# Patient Record
Sex: MALE | Race: WHITE | NOT HISPANIC OR LATINO | Employment: FULL TIME | URBAN - METROPOLITAN AREA
[De-identification: names, ages, dates, MRNs, and addresses within clinical notes are randomized per-mention and may not be internally consistent; named-entity substitution may affect disease eponyms.]

---

## 2017-07-15 ENCOUNTER — OFFICE VISIT (OUTPATIENT)
Dept: URGENT CARE | Facility: URGENT CARE | Age: 32
End: 2017-07-15
Payer: COMMERCIAL

## 2017-07-15 VITALS
SYSTOLIC BLOOD PRESSURE: 147 MMHG | HEIGHT: 74 IN | DIASTOLIC BLOOD PRESSURE: 73 MMHG | WEIGHT: 315 LBS | HEART RATE: 80 BPM | OXYGEN SATURATION: 97 % | BODY MASS INDEX: 40.43 KG/M2 | TEMPERATURE: 97.9 F

## 2017-07-15 DIAGNOSIS — J02.9 ACUTE PHARYNGITIS, UNSPECIFIED ETIOLOGY: Primary | ICD-10-CM

## 2017-07-15 DIAGNOSIS — R07.0 THROAT PAIN: ICD-10-CM

## 2017-07-15 LAB
DEPRECATED S PYO AG THROAT QL EIA: NORMAL
MICRO REPORT STATUS: NORMAL
SPECIMEN SOURCE: NORMAL

## 2017-07-15 PROCEDURE — 87880 STREP A ASSAY W/OPTIC: CPT | Performed by: INTERNAL MEDICINE

## 2017-07-15 PROCEDURE — 99202 OFFICE O/P NEW SF 15 MIN: CPT | Performed by: PHYSICIAN ASSISTANT

## 2017-07-15 PROCEDURE — 87081 CULTURE SCREEN ONLY: CPT | Performed by: INTERNAL MEDICINE

## 2017-07-15 NOTE — NURSING NOTE
"Chief Complaint   Patient presents with     Urgent Care     Pt in clinic to have eval for sore throat.     Pharyngitis       Initial /73  Pulse 80  Temp 97.9  F (36.6  C) (Tympanic)  Ht 6' 2\" (1.88 m)  Wt (!) 344 lb (156 kg)  SpO2 97%  BMI 44.17 kg/m2 Estimated body mass index is 44.17 kg/(m^2) as calculated from the following:    Height as of this encounter: 6' 2\" (1.88 m).    Weight as of this encounter: 344 lb (156 kg).  Medication Reconciliation: complete   Nicole Limon/ MA    "

## 2017-07-15 NOTE — PROGRESS NOTES
"SUBJECTIVE:  Graham Quevedo is a 32 year old male with a chief complaint of sore throat.  Onset of symptoms was 2 hours(s) ago.    Course of illness: sudden onset.  Severity mild  Current and Associated symptoms: cough  and hoarse voice  Treatment measures tried include None tried.  Predisposing factors include HX of Strep.    No past medical history on file.  Current Outpatient Prescriptions   Medication Sig Dispense Refill     order for DME Equipment being ordered: CAM walker size 14 foot (Patient not taking: Reported on 7/15/2017) 1 Device 0     order for DME Equipment being ordered: triloc ankle brace right lower extremity size 14 shoe (Patient not taking: Reported on 7/15/2017) 1 Device 0     Social History   Substance Use Topics     Smoking status: Former Smoker     Smokeless tobacco: Not on file      Comment: Only for 1 year in college     Alcohol use Not on file       ROS:  CONSTITUTIONAL:NEGATIVE for fever, chills, change in weight  EYES: NEGATIVE for vision changes or irritation  ENT/MOUTH: sore throat  RESP:POSITIVE for cough-productive    OBJECTIVE:   /73  Pulse 80  Temp 97.9  F (36.6  C) (Tympanic)  Ht 6' 2\" (1.88 m)  Wt (!) 344 lb (156 kg)  SpO2 97%  BMI 44.17 kg/m2  GENERAL APPEARANCE: healthy, alert and no distress  EYES: EOMI,  PERRL, conjunctiva clear  HENT: TM's normal bilaterally and tonsillar erythema is mild  NECK: supple, non-tender to palpation, no adenopathy noted  RESP: lungs clear to auscultation - no rales, rhonchi or wheezes  CV: regular rates and rhythm, normal S1 S2, no murmur noted  ABDOMEN:  soft, nontender, no HSM or masses and bowel sounds normal  SKIN: no suspicious lesions or rashes    Rapid Strep test is negative; await throat culture results.    ASSESSMENT:    1. Acute pharyngitis, unspecified etiology      2. Throat pain    - Strep, Rapid Screen  - Beta strep group A culture    PLAN:   See orders in epic.   Symptomatic treat with gargles, lozenges, and OTC " analgesic as needed. Follow-up with primary clinic if not improving over the next 3-5 days.

## 2017-07-15 NOTE — MR AVS SNAPSHOT
"              After Visit Summary   7/15/2017    Graham Quevedo    MRN: [id number]           Patient Information     Date Of Birth          1985        Visit Information        Provider Department      7/15/2017 8:35 AM Nash Limon PA-C [facility]        Today's Diagnoses     Acute pharyngitis, unspecified etiology    -  1    Throat pain           Follow-ups after your visit        Who to contact     If you have questions or need follow up information about today's clinic visit or your schedule please contact [facility] directly at [phone].  Normal or non-critical lab and imaging results will be communicated to you by Collective Intellecthart, letter or phone within 4 business days after the clinic has received the results. If you do not hear from us within 7 days, please contact the clinic through Collective Intellecthart or phone. If you have a critical or abnormal lab result, we will notify you by phone as soon as possible.  Submit refill requests through Hotelicopter or call your pharmacy and they will forward the refill request to us. Please allow 3 business days for your refill to be completed.          Additional Information About Your Visit        MyChart Information     Hotelicopter lets you send messages to your doctor, view your test results, renew your prescriptions, schedule appointments and more. To sign up, go to [email or web address] . Click on \"Log in\" on the left side of the screen, which will take you to the Welcome page. Then click on \"Sign up Now\" on the right side of the page.     You will be asked to enter the access code listed below, as well as some personal information. Please follow the directions to create your username and password.     Your access code is: [email or web address]  Expires: 10/13/2017  9:26 AM     Your access code will  in 90 days. If you need help or a new code, please call your [place] clinic or [phone].        Care EveryWhere ID     This is your " "Care EveryWhere ID. This could be used by other organizations to access your [place] medical records  [id number]        Your Vitals Were     Pulse Temperature Height Pulse Oximetry BMI (Body Mass Index)       80 97.9  F (36.6  C) (Tympanic) 6' 2\" (1.88 m) 97% 44.17 kg/m2        Blood Pressure from Last 3 Encounters:   07/15/17 147/73    Weight from Last 3 Encounters:   07/15/17 (!) 344 lb (156 kg)   09/23/16 (!) 350 lb (158.8 kg)              We Performed the Following     Beta strep group A culture     Strep, Rapid Screen        Primary Care Provider    None Specified       No primary provider on file.        Equal Access to Services     [facility] : Jaky Dumont, shahida angeles, aiden crespo, aman mendoza . So [facility] [phone].    ATENCIÓN: Si habla español, tiene a ward disposición servicios gratuitos de asistencia lingüística. Llame al [phone].    We comply with applicable federal civil rights laws and [place] laws. We do not discriminate on the basis of race, color, national origin, age, disability sex, sexual orientation or gender identity.            Thank you!     Thank you for choosing [facility]  for your care. Our goal is always to provide you with excellent care. Hearing back from our patients is one way we can continue to improve our services. Please take a few minutes to complete the written survey that you may receive in the mail after your visit with us. Thank you!             Your Updated Medication List - Protect others around you: Learn how to safely use, store and throw away your medicines at www.disposemymeds.org.          This list is accurate as of: 7/15/17  9:26 AM.  Always use your most recent med list.                   Brand Name Dispense Instructions for use Diagnosis    * order for DME     1 Device    Equipment being ordered: CAM walker size 14 foot    Achilles tendonitis, bilateral       * " order for DME     1 Device    Equipment being ordered: triloc ankle brace right lower extremity size 14 shoe    Achilles tendonitis, bilateral       * Notice:  This list has 2 medication(s) that are the same as other medications prescribed for you. Read the directions carefully, and ask your doctor or other care provider to review them with you.

## 2017-07-16 LAB
BACTERIA SPEC CULT: NORMAL
MICRO REPORT STATUS: NORMAL
SPECIMEN SOURCE: NORMAL

## 2017-12-21 ENCOUNTER — OFFICE VISIT (OUTPATIENT)
Dept: FAMILY MEDICINE | Facility: CLINIC | Age: 32
End: 2017-12-21
Payer: COMMERCIAL

## 2017-12-21 VITALS
WEIGHT: 315 LBS | TEMPERATURE: 97.8 F | RESPIRATION RATE: 16 BRPM | DIASTOLIC BLOOD PRESSURE: 72 MMHG | OXYGEN SATURATION: 97 % | HEART RATE: 80 BPM | SYSTOLIC BLOOD PRESSURE: 124 MMHG | BODY MASS INDEX: 47.25 KG/M2

## 2017-12-21 DIAGNOSIS — Z13.6 CARDIOVASCULAR SCREENING; LDL GOAL LESS THAN 130: ICD-10-CM

## 2017-12-21 DIAGNOSIS — B00.9 HSV (HERPES SIMPLEX VIRUS) INFECTION: Primary | ICD-10-CM

## 2017-12-21 PROCEDURE — 99213 OFFICE O/P EST LOW 20 MIN: CPT | Performed by: NURSE PRACTITIONER

## 2017-12-21 PROCEDURE — 36415 COLL VENOUS BLD VENIPUNCTURE: CPT | Performed by: NURSE PRACTITIONER

## 2017-12-21 PROCEDURE — 80061 LIPID PANEL: CPT | Performed by: NURSE PRACTITIONER

## 2017-12-21 PROCEDURE — 82565 ASSAY OF CREATININE: CPT | Performed by: NURSE PRACTITIONER

## 2017-12-21 RX ORDER — ACYCLOVIR 400 MG/1
400 TABLET ORAL 3 TIMES DAILY
Qty: 15 TABLET | Refills: 5 | Status: SHIPPED | OUTPATIENT
Start: 2017-12-21 | End: 2022-07-21

## 2017-12-21 NOTE — PROGRESS NOTES
SUBJECTIVE:   Graham Quevedo is a 32 year old male who presents to clinic today for the following health issues:      Medication Followup of Acyclovir tablets    Taking Medication as prescribed: has not taken in a few years    Side Effects:  None    Medication Helping Symptoms:  not applicable    Pt has been using OTC Abreva lately with little help        Oral HSV since elementary school.  Has been on acyclovir as needed for outbreaks in the past.  URI and stress triggers outbreak.  Last outbreak 5-6 months ago, occurring a few times a year, increased frequency last winter.      2 kids, works from home.      There is no problem list on file for this patient.    History reviewed. No pertinent surgical history.    Social History   Substance Use Topics     Smoking status: Former Smoker     Smokeless tobacco: Never Used      Comment: Only for 1 year in college     Alcohol use Not on file     History reviewed. No pertinent family history.      Current Outpatient Prescriptions   Medication Sig Dispense Refill     acyclovir (ZOVIRAX) 400 MG tablet Take 1 tablet (400 mg) by mouth 3 times daily 15 tablet 5     order for DME Equipment being ordered: CAM walker size 14 foot (Patient not taking: Reported on 7/15/2017) 1 Device 0     order for DME Equipment being ordered: triloc ankle brace right lower extremity size 14 shoe (Patient not taking: Reported on 7/15/2017) 1 Device 0       ROS:  Integ as above, otherwise negative       OBJECTIVE:                                                    /72 (BP Location: Right arm, Patient Position: Chair, Cuff Size: Adult Large)  Pulse 80  Temp 97.8  F (36.6  C) (Oral)  Resp 16  Wt (!) 368 lb (166.9 kg)  SpO2 97%  BMI 47.25 kg/m2   GENERAL APPEARANCE: healthy, alert and no distress  EYES: Eyes grossly normal to inspection and conjunctivae and sclerae normal  RESP: respirations nonlabored  PSYCH: mentation appears normal and affect normal/bright        ASSESSMENT/PLAN:                                                     (B00.9) HSV (herpes simplex virus) infection  (primary encounter diagnosis)  Plan: acyclovir (ZOVIRAX) 400 MG tablet, Creatinine        Acyclovir three times a day as needed for outbreaks, the sooner you start it the more effective it is.  Labs today.       (Z13.6) CARDIOVASCULAR SCREENING; LDL GOAL LESS THAN 130  Plan: Lipid panel reflex to direct LDL Fasting                See Patient Instructions    Hafsa Aguilera, [facility]    Patient Instructions   1.  Acyclovir three times a day as needed for outbreaks, the sooner you start it the more effective it is.  Labs today.

## 2017-12-21 NOTE — PATIENT INSTRUCTIONS
1.  Acyclovir three times a day as needed for outbreaks, the sooner you start it the more effective it is.  Labs today.

## 2017-12-21 NOTE — MR AVS SNAPSHOT
"              After Visit Summary   12/21/2017    Graham Quevedo    MRN: [id number]           Patient Information     Date Of Birth          1985        Visit Information        Provider Department      12/21/2017 12:20 PM Hafsa Aguilera APRN CNP [facility]        Today's Diagnoses     HSV (herpes simplex virus) infection    -  1    CARDIOVASCULAR SCREENING; LDL GOAL LESS THAN 130          Care Instructions    1.  Acyclovir three times a day as needed for outbreaks, the sooner you start it the more effective it is.  Labs today.             Follow-ups after your visit        Who to contact     If you have questions or need follow up information about today's clinic visit or your schedule please contact [facility] directly at [phone].  Normal or non-critical lab and imaging results will be communicated to you by MyChart, letter or phone within 4 business days after the clinic has received the results. If you do not hear from us within 7 days, please contact the clinic through Tytohart or phone. If you have a critical or abnormal lab result, we will notify you by phone as soon as possible.  Submit refill requests through Cladwell or call your pharmacy and they will forward the refill request to us. Please allow 3 business days for your refill to be completed.          Additional Information About Your Visit        MyChart Information     Cladwell lets you send messages to your doctor, view your test results, renew your prescriptions, schedule appointments and more. To sign up, go to [email or web address] . Click on \"Log in\" on the left side of the screen, which will take you to the Welcome page. Then click on \"Sign up Now\" on the right side of the page.     You will be asked to enter the access code listed below, as well as some personal information. Please follow the directions to create your username and password.     Your access code is: [email or web address]  Expires: " 3/21/2018 12:42 PM     Your access code will  in 90 days. If you need help or a new code, please call your [place] clinic or [phone].        Care EveryWhere ID     This is your Care EveryWhere ID. This could be used by other organizations to access your [place] medical records  [id number]        Your Vitals Were     Pulse Temperature Respirations Pulse Oximetry BMI (Body Mass Index)       80 97.8  F (36.6  C) (Oral) 16 97% 47.25 kg/m2        Blood Pressure from Last 3 Encounters:   17 124/72   07/15/17 147/73    Weight from Last 3 Encounters:   17 (!) 368 lb (166.9 kg)   07/15/17 (!) 344 lb (156 kg)   16 (!) 350 lb (158.8 kg)              We Performed the Following     Creatinine     Lipid panel reflex to direct LDL Fasting          Today's Medication Changes          These changes are accurate as of: 17 12:42 PM.  If you have any questions, ask your nurse or doctor.               Start taking these medicines.        Dose/Directions    acyclovir 400 MG tablet   Commonly known as:  ZOVIRAX   Used for:  HSV (herpes simplex virus) infection   Started by:  Hafsa Aguilera APRN CNP        Dose:  400 mg   Take 1 tablet (400 mg) by mouth 3 times daily   Quantity:  15 tablet   Refills:  5            Where to get your medicines      These medications were sent to [facility] [id number] - [place], [place] -  [address], [place] [address]     Phone:  [phone]     acyclovir 400 MG tablet                Primary Care Provider Office Phone # Fax #    SAMUEL Price [phone] [address]  [facility] [address]        Equal Access to Services     [facility] BLADIMIR : Jaky Dumont, wastephanie angeles, qaybta kaaljuan carlos crespo, aman [place]. So [facility] [phone].    ATENCIÓN: Si habla español, tiene a ward disposición servicios gratuitos de asistencia  lingüísticsilvinaBlanquita Bynum al [phone].    We comply with applicable federal civil rights laws and [place] laws. We do not discriminate on the basis of race, color, national origin, age, disability, sex, sexual orientation, or gender identity.            Thank you!     Thank you for choosing [facility]  for your care. Our goal is always to provide you with excellent care. Hearing back from our patients is one way we can continue to improve our services. Please take a few minutes to complete the written survey that you may receive in the mail after your visit with us. Thank you!             Your Updated Medication List - Protect others around you: Learn how to safely use, store and throw away your medicines at www.disposemymeds.org.          This list is accurate as of: 12/21/17 12:42 PM.  Always use your most recent med list.                   Brand Name Dispense Instructions for use Diagnosis    acyclovir 400 MG tablet    ZOVIRAX    15 tablet    Take 1 tablet (400 mg) by mouth 3 times daily    HSV (herpes simplex virus) infection       * order for DME     1 Device    Equipment being ordered: CAM walker size 14 foot    Achilles tendonitis, bilateral       * order for DME     1 Device    Equipment being ordered: triloc ankle brace right lower extremity size 14 shoe    Achilles tendonitis, bilateral       * Notice:  This list has 2 medication(s) that are the same as other medications prescribed for you. Read the directions carefully, and ask your doctor or other care provider to review them with you.

## 2017-12-21 NOTE — LETTER
December 27, 2017      Graham Quevedo  [address] [facility] [address]        Dear ,    We are writing to inform you of your test results.    Cholesterol looks good, normal kidney function.    Resulted Orders   Creatinine   Result Value Ref Range    Creatinine 0.73 0.66 - 1.25 mg/dL    GFR Estimate >90 >60 mL/min/1.7m2      Comment:      Non  GFR Calc    GFR Estimate If Black >90 >60 mL/min/1.7m2      Comment:      African American GFR Calc   Lipid panel reflex to direct LDL Fasting   Result Value Ref Range    Cholesterol 187 <200 mg/dL    Triglycerides 151 (H) <150 mg/dL      Comment:      Borderline high:  150-199 mg/dl  High:             200-499 mg/dl  Very high:       >499 mg/dl      HDL Cholesterol 42 >39 mg/dL    LDL Cholesterol Calculated 115 (H) <100 mg/dL      Comment:      Above desirable:  100-129 mg/dl  Borderline High:  130-159 mg/dL  High:             160-189 mg/dL  Very high:       >189 mg/dl      Non HDL Cholesterol 145 (H) <130 mg/dL      Comment:      Above Desirable:  130-159 mg/dl  Borderline high:  160-189 mg/dl  High:             190-219 mg/dl  Very high:       >219 mg/dl         If you have any questions or concerns, please call the clinic at the number listed above.       Sincerely,        Hafsa Aguilera, APRN CNP/nr

## 2017-12-22 LAB
CHOLEST SERPL-MCNC: 187 MG/DL
CREAT SERPL-MCNC: 0.73 MG/DL (ref 0.66–1.25)
GFR SERPL CREATININE-BSD FRML MDRD: >90 ML/MIN/1.7M2
HDLC SERPL-MCNC: 42 MG/DL
LDLC SERPL CALC-MCNC: 115 MG/DL
NONHDLC SERPL-MCNC: 145 MG/DL
TRIGL SERPL-MCNC: 151 MG/DL

## 2017-12-22 NOTE — PROGRESS NOTES
Please send out result letter with the following note, thanks.    Cholesterol looks good, normal kidney function.    -Hafsa Aguilera, CNP

## 2018-05-08 ENCOUNTER — OFFICE VISIT (OUTPATIENT)
Dept: FAMILY MEDICINE | Facility: CLINIC | Age: 33
End: 2018-05-08
Payer: COMMERCIAL

## 2018-05-08 VITALS
HEIGHT: 74 IN | BODY MASS INDEX: 40.43 KG/M2 | WEIGHT: 315 LBS | SYSTOLIC BLOOD PRESSURE: 147 MMHG | HEART RATE: 56 BPM | DIASTOLIC BLOOD PRESSURE: 85 MMHG | TEMPERATURE: 98 F | RESPIRATION RATE: 10 BRPM | OXYGEN SATURATION: 100 %

## 2018-05-08 DIAGNOSIS — R03.0 ELEVATED BLOOD PRESSURE READING WITHOUT DIAGNOSIS OF HYPERTENSION: ICD-10-CM

## 2018-05-08 DIAGNOSIS — N50.89 SWELLING OF RIGHT TESTICLE: Primary | ICD-10-CM

## 2018-05-08 DIAGNOSIS — B37.9 CANDIDA INFECTION: ICD-10-CM

## 2018-05-08 PROCEDURE — 99214 OFFICE O/P EST MOD 30 MIN: CPT | Performed by: FAMILY MEDICINE

## 2018-05-08 RX ORDER — FLUCONAZOLE 150 MG/1
150 TABLET ORAL ONCE
Qty: 1 TABLET | Refills: 0 | Status: SHIPPED | OUTPATIENT
Start: 2018-05-08 | End: 2018-05-08

## 2018-05-08 NOTE — MR AVS SNAPSHOT
"              After Visit Summary   2018    Graham Quevedo    MRN: [id number]           Patient Information     Date Of Birth          1985        Visit Information        Provider Department      2018 1:40 PM Bon Bhatia MD [facility]        Care Instructions    Please call [phone] to schedule scrotal ultrasound.           Follow-ups after your visit        Who to contact     If you have questions or need follow up information about today's clinic visit or your schedule please contact [facility] directly at [phone].  Normal or non-critical lab and imaging results will be communicated to you by Affaredelgiornohart, letter or phone within 4 business days after the clinic has received the results. If you do not hear from us within 7 days, please contact the clinic through Affaredelgiornohart or phone. If you have a critical or abnormal lab result, we will notify you by phone as soon as possible.  Submit refill requests through Publisha or call your pharmacy and they will forward the refill request to us. Please allow 3 business days for your refill to be completed.          Additional Information About Your Visit        MyChart Information     Publisha lets you send messages to your doctor, view your test results, renew your prescriptions, schedule appointments and more. To sign up, go to [email or web address] . Click on \"Log in\" on the left side of the screen, which will take you to the Welcome page. Then click on \"Sign up Now\" on the right side of the page.     You will be asked to enter the access code listed below, as well as some personal information. Please follow the directions to create your username and password.     Your access code is: [email or web address]  Expires: 2018  2:27 PM     Your access code will  in 90 days. If you need help or a new code, please call your [facility] or [phone].        Care EveryWhere ID     This is your Care EveryWhere ID. This " "could be used by other organizations to access your [place] medical records  [id number]        Your Vitals Were     Pulse Temperature Respirations Height Pulse Oximetry BMI (Body Mass Index)    56 98  F (36.7  C) (Oral) 10 6' 2\" (1.88 m) 100% 47.25 kg/m2       Blood Pressure from Last 3 Encounters:   05/08/18 147/85   12/21/17 124/72   07/15/17 147/73    Weight from Last 3 Encounters:   05/08/18 (!) 368 lb (166.9 kg)   12/21/17 (!) 368 lb (166.9 kg)   07/15/17 (!) 344 lb (156 kg)              Today, you had the following     No orders found for display       Primary Care Provider Office Phone # Fax #    Hafsa SAMUEL Reis [facility] [phone]       [address]  [facility] [address]        Equal Access to Services     TYREE GARRISON : Hadii grisel ku hadasho Sostevenali, waaxda luqadaha, qaybta kaalmada adeegyada, waxkaci hayesin germania mendoza . So [facility] [phone].    ATENCIÓN: Si habla español, tiene a ward disposición servicios gratuitos de asistencia lingüística. Llame al [phone].    We comply with applicable federal civil rights laws and [place] laws. We do not discriminate on the basis of race, color, national origin, age, disability, sex, sexual orientation, or gender identity.            Thank you!     Thank you for choosing [facility]  for your care. Our goal is always to provide you with excellent care. Hearing back from our patients is one way we can continue to improve our services. Please take a few minutes to complete the written survey that you may receive in the mail after your visit with us. Thank you!             Your Updated Medication List - Protect others around you: Learn how to safely use, store and throw away your medicines at www.disposemymeds.org.          This list is accurate as of 5/8/18  2:27 PM.  Always use your most recent med list.                   Brand Name Dispense Instructions for use Diagnosis    acyclovir 400 MG tablet    ZOVIRAX "    15 tablet    Take 1 tablet (400 mg) by mouth 3 times daily    HSV (herpes simplex virus) infection       * order for DME     1 Device    Equipment being ordered: CAM walker size 14 foot    Achilles tendonitis, bilateral       * order for DME     1 Device    Equipment being ordered: triloc ankle brace right lower extremity size 14 shoe    Achilles tendonitis, bilateral       * Notice:  This list has 2 medication(s) that are the same as other medications prescribed for you. Read the directions carefully, and ask your doctor or other care provider to review them with you.

## 2018-05-08 NOTE — PROGRESS NOTES
"  SUBJECTIVE:   Graham Quevedo is a 32 year old male who presents to clinic today for the following health issues:    Around one month ago pt was taking a shower and he noticed his right testicle was larger then his left. The last few times he has had intercourse with his wife, she had a yeast infection. He wanted to make sure that everything was ok. No testicular pain/discomfort, urethral discharge or rash. Pt is circumscribed.     Problem list and histories reviewed & adjusted, as indicated.  Additional history: as documented    Labs reviewed in EPIC    Reviewed and updated as needed this visit by clinical staff  Allergies       Reviewed and updated as needed this visit by Provider         ROS:  Constitutional, HEENT, cardiovascular, pulmonary, gi and gu systems are negative, except as otherwise noted.    OBJECTIVE:     /85  Pulse 56  Temp 98  F (36.7  C) (Oral)  Resp 10  Ht 6' 2\" (1.88 m)  Wt (!) 368 lb (166.9 kg)  SpO2 100%  BMI 47.25 kg/m2  Body mass index is 47.25 kg/(m^2).   GENERAL: healthy, alert and no distress  EYES: Eyes grossly normal to inspection  HENT: nose and mouth without ulcers or lesions   (male): no urethral discharge, no hernia, right testicle larger than left with no tenderness   SKIN: no rashes     Diagnostic Test Results:  none     ASSESSMENT/PLAN:       1. Swelling of right testicle  - ordered an US for further evaluation   - US Testicular and Scrotum; Future    2. Candida infection  - Wife has had recurrent yeast infections and pt is concerned that he might have the infection. We discussed that exam has been normal however if his wife has a recurrent infection over the next month then pt can take Fluconazole X 1.   - fluconazole (DIFLUCAN) 150 MG tablet; Take 1 tablet (150 mg) by mouth once for 1 dose  Dispense: 1 tablet; Refill: 0    3. Elevated blood pressure reading without diagnosis of hypertension  - MA blood pressure check in one week         Bon Bhatia, " MD  [facility]

## 2018-05-09 ENCOUNTER — HOSPITAL ENCOUNTER (OUTPATIENT)
Dept: ULTRASOUND IMAGING | Facility: CLINIC | Age: 33
Discharge: HOME | End: 2018-05-09
Attending: FAMILY MEDICINE | Admitting: FAMILY MEDICINE
Payer: COMMERCIAL

## 2018-05-09 DIAGNOSIS — N50.89 SWELLING OF RIGHT TESTICLE: ICD-10-CM

## 2018-05-09 PROCEDURE — 76870 US EXAM SCROTUM: CPT

## 2018-05-15 ENCOUNTER — TELEPHONE (OUTPATIENT)
Dept: FAMILY MEDICINE | Facility: CLINIC | Age: 33
End: 2018-05-15

## 2018-05-15 NOTE — TELEPHONE ENCOUNTER
RN, can you please inform pt of ultrasound results which showed bilateral fluid collection within the scrotum which could represent hematoma (collection of blood) or hydrocele (collection of fluid). I have referred him to urology clinic for further evaluation.       Your provider has referred you to: [facility] [place] [facility] [place] [phone]   [email or web address]      IMPRESSION:  1. Bilateral mildly complex fluid collections within the scrotum,  moderate in size with internal septations on the right and small  volume on the left. Additionally, there are several mobile, echogenic  foci within the scrotum, the largest measuring up to 7 mm on the  right. Findings are nonspecific though possible differential includes  evolution of hematoma/hematocele versus complex hydrocele. Correlate  with past history of trauma and/or infection and consider follow-up  ultrasound to ensure stability/improvement if clinically indicated.  2. Normal sonographic appearance of the testicles without focal mass.  Normal Doppler evaluation without testicular torsion.

## 2018-05-15 NOTE — TELEPHONE ENCOUNTER
Attempted to reach, unable. Left message  to call back.    Pt to call to schedule with urology:  Your provider has referred you to: [facility]: [place] [facility] - [place] [phone]   [email or web address]        Samantha Sanders RN

## 2018-05-17 NOTE — TELEPHONE ENCOUNTER
I was able to reach patient and gave him Jannette's msg and the referral phone number.  He agrees with plan.    Eliza MARTÍNEZ

## 2018-05-18 ENCOUNTER — PRE VISIT (OUTPATIENT)
Dept: UROLOGY | Facility: CLINIC | Age: 33
End: 2018-05-18

## 2018-05-18 NOTE — TELEPHONE ENCOUNTER
MEDICAL RECORDS REQUEST   [facility] [facility]  Urology Clinic  [address] [place] [address]  PHONE: [phone]  Fax: [phone]        FUTURE VISIT INFORMATION                                                   Graham Quevedo, : 1985 scheduled for future visit at [facility]    APPOINTMENT INFORMATION:    Date: 2018    Provider:  CONTRERAS ROMERO    Reason for Visit/Diagnosis: TESTICULAR SWELLING    REFERRAL INFORMATION:    Referring provider:  DUSTIN GAFFNEY    Specialty: PCP    Referring providers clinic:  [facility] contact number:  [phone]    RECORDS REQUESTED FOR VISIT                                                     NOTES  STATUS/DETAILS   OFFICE NOTE from referring provider  yes   OFFICE NOTE from other specialist  no   DISCHARGE SUMMARY from hospital  no   DISCHARGE REPORT from the ER  no   OPERATIVE REPORT  no   MEDICATION LIST  no   IMAGING (IMAGES & REPORT)  yes   TESTICULAR US IN EPIC.. [facility]       PRE-VISIT CHECKLIST      Record collection complete Yes RECORDS IN EPIC.. [facility]   Appointment appropriately scheduled           (right time/right provider) Yes   Joint diagnostic appointment coordinated correctly          (ensure right order & amount of time) Yes   MyChart activation Yes   Questionnaire complete If no, please explain IN PROCESS     Completed by: Delfina Limon

## 2018-05-24 ENCOUNTER — TELEPHONE (OUTPATIENT)
Dept: FAMILY MEDICINE | Facility: CLINIC | Age: 33
End: 2018-05-24

## 2018-05-24 DIAGNOSIS — N50.89 SWOLLEN TESTICLE: Primary | ICD-10-CM

## 2018-05-24 NOTE — TELEPHONE ENCOUNTER
Patient was referred to urology at the [facility] for testicular swelling  Earliest they were able to give him an appointment was 7/12/18.    Wife is asking where else he can go to be seen or how he can get in sooner.  To be seen sooner at the  would need provider to provider call or I have pended referral with other urology clinic options.    Provider to provider for earlier appointment would need to contact the on-call by calling [phone]  Please call patient back with options.  Aydee Silverman, RN

## 2018-05-24 NOTE — TELEPHONE ENCOUNTER
I left detailed msg with additional urology clinic phone number.  I also told them if they need numbers repeated they could call back and ask the phone rep to read them again.      UROLOGY ADULT REFERRAL [[id number]]      Electronically signed by: Geno Soria PA-C on 05/24/18 1416 Status: Active     Ordering user: Geno Soria PA-C 05/24/18 1416       Released by: Geno Soria PA-C 05/24/18 1416         Order History  Outpatient     Date/Time Action Taken User Additional Information     05/24/18 1211 Pend Aydee Silverman RN      05/24/18 1416 Sign Geno Soria PA-C        Comments      Your provider has referred you to:   [facility]: [facility] [place] [phone]   [email or web address]  [facility]: [facility] [place] [phone]   [email or web address]  [facility]: [facility] [place] [phone]   [email or web address]  [facility]: [facility] [place] [phone]   [email or web address]  [place] [phone]   [email or web address]  [place] [phone]   [email or web address]

## 2018-07-02 ENCOUNTER — PRE VISIT (OUTPATIENT)
Dept: UROLOGY | Facility: CLINIC | Age: 33
End: 2018-07-02

## 2018-07-02 NOTE — TELEPHONE ENCOUNTER
Patient with history of testicular swelling coming in for consult with Dr. Cassidy. Patient chart reviewed, no need for call, all records available and ready for appointment.

## 2018-12-18 ENCOUNTER — APPOINTMENT (OUTPATIENT)
Dept: CT IMAGING | Facility: CLINIC | Age: 33
End: 2018-12-18
Attending: EMERGENCY MEDICINE
Payer: COMMERCIAL

## 2018-12-18 ENCOUNTER — HOSPITAL ENCOUNTER (EMERGENCY)
Facility: CLINIC | Age: 33
Discharge: HOME | End: 2018-12-18
Attending: EMERGENCY MEDICINE | Admitting: EMERGENCY MEDICINE
Payer: COMMERCIAL

## 2018-12-18 VITALS
WEIGHT: 315 LBS | BODY MASS INDEX: 40.43 KG/M2 | SYSTOLIC BLOOD PRESSURE: 132 MMHG | DIASTOLIC BLOOD PRESSURE: 90 MMHG | TEMPERATURE: 98.8 F | HEIGHT: 74 IN | RESPIRATION RATE: 20 BRPM | OXYGEN SATURATION: 96 %

## 2018-12-18 DIAGNOSIS — S09.90XA CLOSED HEAD INJURY, INITIAL ENCOUNTER: ICD-10-CM

## 2018-12-18 DIAGNOSIS — S16.1XXA CERVICAL STRAIN, INITIAL ENCOUNTER: ICD-10-CM

## 2018-12-18 PROCEDURE — 25000132 ZZH RX MED GY IP 250 OP 250 PS 637: Performed by: EMERGENCY MEDICINE

## 2018-12-18 PROCEDURE — 70450 CT HEAD/BRAIN W/O DYE: CPT

## 2018-12-18 PROCEDURE — 72125 CT NECK SPINE W/O DYE: CPT

## 2018-12-18 PROCEDURE — 99284 EMERGENCY DEPT VISIT MOD MDM: CPT | Mod: 25

## 2018-12-18 RX ORDER — ACETAMINOPHEN 325 MG/1
650 TABLET ORAL ONCE
Status: COMPLETED | OUTPATIENT
Start: 2018-12-18 | End: 2018-12-18

## 2018-12-18 RX ADMIN — ACETAMINOPHEN 650 MG: 325 TABLET, FILM COATED ORAL at 22:24

## 2018-12-18 ASSESSMENT — ENCOUNTER SYMPTOMS
HEADACHES: 1
ABDOMINAL PAIN: 0
NAUSEA: 0
ARTHRALGIAS: 1

## 2018-12-18 ASSESSMENT — MIFFLIN-ST. JEOR: SCORE: 2592.34

## 2018-12-18 NOTE — ED AVS SNAPSHOT
Emergency Department  [address] [facility] [address]  Phone:  [phone]  Fax:  [phone]                                    Graham Quevedo   MRN: [id number]    Department:   Emergency Department   Date of Visit:  12/18/2018           After Visit Summary Signature Page    I have received my discharge instructions, and my questions have been answered. I have discussed any challenges I see with this plan with the nurse or doctor.    ..........................................................................................................................................  Patient/Patient Representative Signature      ..........................................................................................................................................  Patient Representative Print Name and Relationship to Patient    ..................................................               ................................................  Date                                   Time    ..........................................................................................................................................  Reviewed by Signature/Title    ...................................................              ..............................................  Date                                               Time          22EPIC Rev 08/18

## 2018-12-19 NOTE — ED PROVIDER NOTES
"  History     Chief Complaint:  Fall    The history is provided by the patient.      Graham Quevedo is an otherwise healthy 33 year old male who presents for evaluation of a headache and pain to the back of his head and neck secondary to a fall sustained just prior to presentation.  Patient reports that at 7:30 PM today he was helping paint his friend's house when he \"missed a step\" and fell backwards down a flight of stairs, hitting the back of his head on the stairs which were carpeted.  The patient's wife states that these carpeted steps were very thin and that there was cement directly underneath.  Patient states that he \"saw stars\" when this occurred but that he did not lose consciousness.  The patient also endorses some left elbow soreness after this fall along with chronic lower back pain which he has had since he fell off a ladder last week. Patient denies nausea, chest pain, abdominal pain, or other complaints.      Allergies:  No known drug allergies     Medications:    The patient is not currently taking any prescribed medications.     Past Medical History:    The patient does not have any past pertinent medical history.     Past Surgical History:    History reviewed. No pertinent surgical history.     Family History:    History reviewed. No pertinent family history.      Social History:  Presents with spouse   Tobacco use: Former smoker (for 1 year)  Alcohol use: Not on file   PCP: Physician No Ref-Primary    Marital Status:       Review of Systems   Cardiovascular: Negative for chest pain.   Gastrointestinal: Negative for abdominal pain and nausea.   Musculoskeletal: Positive for arthralgias.   Neurological: Positive for headaches.   All other systems reviewed and are negative.    Physical Exam     Patient Vitals for the past 24 hrs:   BP Temp Temp src Heart Rate Resp SpO2 Height Weight   12/18/18 2136 132/90 98.8  F (37.1  C) Oral 82 20 96 % 1.88 m (6' 2\") (!) 158.8 kg (350 lb)        Physical " Exam  Nursing note and vitals reviewed.  Constitutional:  Oriented to person, place, and time. Cooperative.  In a c-collar.  HENT:   Head:   Atraumatic.  Nose:    Nose normal.   Mouth/Throat:   Mucous membranes are normal.   Eyes:    Conjunctivae normal and EOM are normal.      Pupils are equal, round, and reactive to light.   Neck:    Tenderness to palpation to the cervical spine region.  Cardiovascular:  Normal rate, regular rhythm, normal heart sounds and normal pulses. No murmur heard.  Pulmonary/Chest:  Effort normal and breath sounds normal.   Abdominal:   Soft. Normal appearance and bowel sounds are normal.      There is no tenderness.      There is no rebound and no CVA tenderness.   Musculoskeletal:  Extremities atraumatic x 4 including the left elbow.   Lymphadenopathy:  No cervical adenopathy.   Neurological:   Alert and oriented to person, place, and time. Normal strength.      No cranial nerve deficit or sensory deficit. GCS eye subscore is 4. GCS verbal subscore is 5. GCS motor subscore is 6.   Skin:    Skin is intact. No rash noted.   Psychiatric:   Normal mood and affect.     Emergency Department Course     Imaging:  Radiographic findings were communicated with the patient and family who voiced understanding of the findings.    CT Head without contrast:  IMPRESSION: Normal CT scan of the head    CT Cervical spine without contrast:  IMPRESSION:    1. No evidence of acute trauma.  2. C6-C7 mild degenerative disc disease.  3. Beam hardening artifact obscures the spinal canal from C6 down    Imaging independently reviewed and agree with radiologist interpretation.       Interventions:  2215: Tylenol 650 mg PO      Emergency Department Course:  Past medical records, nursing notes, and vitals reviewed.  2148: I performed an exam of the patient and obtained history, as documented above.     Above interventions provided.  The patient was sent for a head CT while in the emergency department, findings  above.    2235: I rechecked the patient. Findings and plan explained to the Patient and spouse. Patient discharged home with instructions regarding supportive care, medications, and reasons to return. The importance of close follow-up was reviewed.       Impression & Plan    Medical Decision Making:  This is a 33-year-old male who came in after he fell down some stairs and sustained a head injury as well as neck pain.  He had a c-collar placed in triage.  He was provided Tylenol here per his request, and I obtained CT scans of his head and his neck.  There are no signs of an intracranial hemorrhage or skull fracture or any cervical spine fractures.  I then removed his c-collar, and he had normal range of motion of his neck.  He understands that he could have a concussion and the ramifications of that diagnosis.  He also understands that he likely will feel worse tomorrow.  He is instructed to return with any concerns or worsening symptoms and follow-up with his physician if he is not improving in a timely fashion.    Diagnosis:    ICD-10-CM   1. Closed head injury, initial encounter S09.90XA   2. Cervical strain, initial encounter S16.1XXA       Disposition:  Discharged to home with plan as outlined.    Scribe Disclosure:  I, Virgil Marek, am serving as a scribe at 10:00 PM on 12/18/2018 to document services personally performed by Torsten Valero MD based on my observations and the provider's statements to me.  12/18/2018   EMERGENCY DEPARTMENT      Torsten Valero MD  12/19/18 0009

## 2021-07-09 ENCOUNTER — OFFICE VISIT (OUTPATIENT)
Dept: FAMILY MEDICINE | Facility: CLINIC | Age: 36
End: 2021-07-09
Payer: COMMERCIAL

## 2021-07-09 VITALS
TEMPERATURE: 97.9 F | HEART RATE: 74 BPM | OXYGEN SATURATION: 97 % | BODY MASS INDEX: 39.17 KG/M2 | WEIGHT: 315 LBS | DIASTOLIC BLOOD PRESSURE: 64 MMHG | RESPIRATION RATE: 10 BRPM | HEIGHT: 75 IN | SYSTOLIC BLOOD PRESSURE: 128 MMHG

## 2021-07-09 DIAGNOSIS — Z30.09 VASECTOMY EVALUATION: ICD-10-CM

## 2021-07-09 DIAGNOSIS — E66.01 MORBID OBESITY (H): ICD-10-CM

## 2021-07-09 DIAGNOSIS — Z01.818 PRE-OPERATIVE EXAMINATION: Primary | ICD-10-CM

## 2021-07-09 DIAGNOSIS — N43.3 HYDROCELE IN ADULT: ICD-10-CM

## 2021-07-09 LAB
ANION GAP SERPL CALCULATED.3IONS-SCNC: 5 MMOL/L (ref 3–14)
BUN SERPL-MCNC: 16 MG/DL (ref 7–30)
CALCIUM SERPL-MCNC: 9.4 MG/DL (ref 8.5–10.1)
CHLORIDE SERPL-SCNC: 110 MMOL/L (ref 94–109)
CO2 SERPL-SCNC: 25 MMOL/L (ref 20–32)
CREAT SERPL-MCNC: 0.89 MG/DL (ref 0.66–1.25)
GFR SERPL CREATININE-BSD FRML MDRD: >90 ML/MIN/{1.73_M2}
GLUCOSE SERPL-MCNC: 150 MG/DL (ref 70–99)
HGB BLD-MCNC: 16.6 G/DL (ref 13.3–17.7)
POTASSIUM SERPL-SCNC: 4.5 MMOL/L (ref 3.4–5.3)
SODIUM SERPL-SCNC: 140 MMOL/L (ref 133–144)

## 2021-07-09 PROCEDURE — 80048 BASIC METABOLIC PNL TOTAL CA: CPT | Performed by: FAMILY MEDICINE

## 2021-07-09 PROCEDURE — 36415 COLL VENOUS BLD VENIPUNCTURE: CPT | Performed by: FAMILY MEDICINE

## 2021-07-09 PROCEDURE — 85018 HEMOGLOBIN: CPT | Performed by: FAMILY MEDICINE

## 2021-07-09 PROCEDURE — 99204 OFFICE O/P NEW MOD 45 MIN: CPT | Performed by: FAMILY MEDICINE

## 2021-07-09 ASSESSMENT — MIFFLIN-ST. JEOR: SCORE: 2725.67

## 2021-07-09 ASSESSMENT — PAIN SCALES - GENERAL: PAINLEVEL: SEVERE PAIN (7)

## 2021-07-09 NOTE — PROGRESS NOTES
[address] [address]  Phone: [phone]  Primary Provider: Irvin Veewn  Pre-op Performing Provider: VELIA ALVAREZ     :[id number]}  PREOPERATIVE EVALUATION:  Today's date: 7/9/2021    Graham Quevedo is a 36 year old male who presents for a preoperative evaluation.    Surgical Information:  Surgery/Procedure: Right Hydrocelectomy and vesectomy  Surgery Location: [facility] OR  Surgeon: Michoacano  Surgery Date: 07/13/21  Time of Surgery: TBD  Where patient plans to recover: At home with family  Fax number for surgical facility: Note does not need to be faxed, will be available electronically in Epic.    Type of Anesthesia Anticipated: to be determined        Subjective     HPI related to upcoming procedure:     Preop Questions 7/9/2021   1. Have you ever had a heart attack or stroke? No   2. Have you ever had surgery on your heart or blood vessels, such as a stent placement, a coronary artery bypass, or surgery on an artery in your head, neck, heart, or legs? No   3. Do you have chest pain with activity? No   4. Do you have a history of  heart failure? No   5. Do you currently have a cold, bronchitis or symptoms of other infection? No   6. Do you have a cough, shortness of breath, or wheezing? No   7. Do you or anyone in your family have previous history of blood clots? No   8. Do you or does anyone in your family have a serious bleeding problem such as prolonged bleeding following surgeries or cuts? No   9. Have you ever had problems with anemia or been told to take iron pills? No   10. Have you had any abnormal blood loss such as black, tarry or bloody stools? No   11. Have you ever had a blood transfusion? No   12. Are you willing to have a blood transfusion if it is medically needed before, during, or after your surgery? Yes   13. Have you or any of your relatives ever had problems with anesthesia? No   14. Do you have sleep apnea, excessive  "snoring or daytime drowsiness? No   15. Do you have any artifical heart valves or other implanted medical devices like a pacemaker, defibrillator, or continuous glucose monitor? No   16. Do you have artificial joints? No   17. Are you allergic to latex? No       Health Care Directive:  Patient does not have a Health Care Directive or Living Will:    Preoperative Review of :   reviewed - no record of controlled substances prescribed.              Review of Systems  CONSTITUTIONAL: NEGATIVE for fever, chills, change in weight  ENT/MOUTH: NEGATIVE for ear, mouth and throat problems  RESP: NEGATIVE for significant cough or SOB  CV: NEGATIVE for chest pain, palpitations or peripheral edema  Rest of the ROS is negative.    Patient Active Problem List    Diagnosis Date Noted     Morbid obesity (H) 07/09/2021     Priority: Medium     Lipoma Of The Adipose Tissue      Priority: Medium     Created by Conversion  Replacement Utility updated for latest IMO load          No past medical history on file.  No past surgical history on file.  Current Outpatient Medications   Medication Sig Dispense Refill     acyclovir (ZOVIRAX) 400 MG tablet Take 1 tablet (400 mg) by mouth 3 times daily (Patient not taking: Reported on 5/8/2018) 15 tablet 5     order for DME Equipment being ordered: CAM walker size 14 foot (Patient not taking: Reported on 5/8/2018) 1 Device 0     order for DME Equipment being ordered: triloc ankle brace right lower extremity size 14 shoe (Patient not taking: Reported on 5/8/2018) 1 Device 0       No Known Allergies     Social History     Tobacco Use     Smoking status: Former Smoker     Smokeless tobacco: Never Used     Tobacco comment: Only for 1 year in college   Substance Use Topics     Alcohol use: Not on file     No family history on file.  History   Drug Use Not on file         Objective     /64   Pulse 74   Temp 97.9  F (36.6  C) (Tympanic)   Resp 10   Ht 1.905 m (6' 3\")   Wt (!) 172 kg (379 " lb 3.2 oz)   SpO2 97%   BMI 47.40 kg/m      Physical Exam  GENERAL APPEARANCE: healthy, alert and no distress  HENT: ear canals and TM's normal and nose and mouth without ulcers or lesions  RESP: lungs clear to auscultation - no rales, rhonchi or wheezes  CV: regular rate and rhythm, normal S1 S2, no S3 or S4 and no murmur, click or rub   ABDOMEN: soft, nontender, no HSM or masses and bowel sounds normal  NEURO: Normal strength and tone, sensory exam grossly normal, mentation intact and speech normal      Diagnostics:  Labs pending at this time.  Results will be reviewed when available.   No EKG required, no history of coronary heart disease, significant arrhythmia, peripheral arterial disease or other structural heart disease.    Revised Cardiac Risk Index (RCRI):  The patient has the following serious cardiovascular risks for perioperative complications:   - No serious cardiac risks = 0 points     RCRI Interpretation: 0 points: Class I (very low risk - 0.4% complication rate)      ICD-10-CM    1. Pre-operative examination  Z01.818 REVIEW OF HEALTH MAINTENANCE PROTOCOL ORDERS     Hemoglobin     Basic metabolic panel   2. Morbid obesity (H)  E66.01    3. Hydrocele in adult  N43.3 REVIEW OF HEALTH MAINTENANCE PROTOCOL ORDERS     Hemoglobin     Basic metabolic panel   4. Vasectomy evaluation  Z30.09      Patient does not have any risk factor he is advised to proceed with the surgery as planned. No contraindication.       Signed Electronically by: Cole Lund MD  Copy of this evaluation report is provided to requesting physician.

## 2021-08-14 ENCOUNTER — HEALTH MAINTENANCE LETTER (OUTPATIENT)
Age: 36
End: 2021-08-14

## 2021-10-09 ENCOUNTER — HEALTH MAINTENANCE LETTER (OUTPATIENT)
Age: 36
End: 2021-10-09

## 2022-07-21 ENCOUNTER — OFFICE VISIT (OUTPATIENT)
Dept: FAMILY MEDICINE | Facility: CLINIC | Age: 37
End: 2022-07-21
Payer: COMMERCIAL

## 2022-07-21 VITALS
HEIGHT: 75 IN | DIASTOLIC BLOOD PRESSURE: 92 MMHG | RESPIRATION RATE: 16 BRPM | BODY MASS INDEX: 39.17 KG/M2 | SYSTOLIC BLOOD PRESSURE: 134 MMHG | OXYGEN SATURATION: 97 % | HEART RATE: 100 BPM | TEMPERATURE: 97.3 F | WEIGHT: 315 LBS

## 2022-07-21 DIAGNOSIS — Z01.818 PREOP GENERAL PHYSICAL EXAM: Primary | ICD-10-CM

## 2022-07-21 DIAGNOSIS — E66.01 MORBID OBESITY (H): ICD-10-CM

## 2022-07-21 DIAGNOSIS — Z23 NEED FOR DIPHTHERIA-TETANUS-PERTUSSIS (TDAP) VACCINE: ICD-10-CM

## 2022-07-21 DIAGNOSIS — N43.3 HYDROCELE IN ADULT: ICD-10-CM

## 2022-07-21 LAB — HGB BLD-MCNC: 15.9 G/DL (ref 13.3–17.7)

## 2022-07-21 PROCEDURE — 90715 TDAP VACCINE 7 YRS/> IM: CPT | Performed by: PHYSICIAN ASSISTANT

## 2022-07-21 PROCEDURE — 99214 OFFICE O/P EST MOD 30 MIN: CPT | Mod: 25 | Performed by: PHYSICIAN ASSISTANT

## 2022-07-21 PROCEDURE — 36415 COLL VENOUS BLD VENIPUNCTURE: CPT | Performed by: PHYSICIAN ASSISTANT

## 2022-07-21 PROCEDURE — 90471 IMMUNIZATION ADMIN: CPT | Performed by: PHYSICIAN ASSISTANT

## 2022-07-21 PROCEDURE — 80053 COMPREHEN METABOLIC PANEL: CPT | Performed by: PHYSICIAN ASSISTANT

## 2022-07-21 PROCEDURE — 85018 HEMOGLOBIN: CPT | Performed by: PHYSICIAN ASSISTANT

## 2022-07-21 NOTE — PROGRESS NOTES
[facility]  [address], [address]  [facility] [address]  Phone: [phone]  Primary Provider: [facility]  Pre-op Performing Provider: VERONIQUE PACHECO      PREOPERATIVE EVALUATION:  Today's date: 7/21/2022    Graham Quevedo is a 37 year old male who presents for a preoperative evaluation.    Surgical Information:  Surgery/Procedure: BILATERAL VASECTOMY  Surgery Location: [facility]   Surgeon: Xavier Mustafa MD  Surgery Date: 07/27/2022  Time of Surgery: TBD  Where patient plans to recover: At home with family  Fax number for surgical facility: Note does not need to be faxed, will be available electronically in Epic.    Type of Anesthesia Anticipated: General    Assessment & Plan     The proposed surgical procedure is considered INTERMEDIATE risk.      ICD-10-CM    1. Preop general physical exam  Z01.818 Hemoglobin     Comprehensive metabolic panel     Hemoglobin     Comprehensive metabolic panel   2. Hydrocele in adult  N43.3    3. Morbid obesity (H)  E66.01    4. Need for diphtheria-tetanus-pertussis (Tdap) vaccine  Z23 TDAP VACCINE (Adacel, Boostrix)       Risks and Recommendations:  The patient has the following additional risks and recommendations for perioperative complications:   - No identified additional risk factors other than previously addressed    Medication Instructions:  Patient is on no chronic medications    RECOMMENDATION:  APPROVAL GIVEN to proceed with proposed procedure, without further diagnostic evaluation.    Review of prior external note(s) from - previous routine PCP and speciality notes    15 minutes spent on the date of the encounter doing chart review, history and exam, documentation and further activities per the note        Subjective     HPI related to upcoming procedure: history of hydrocele with plans for repair/vasectomy    Preop Questions 7/21/2022   1. Have you ever had a heart  attack or stroke? No   2. Have you ever had surgery on your heart or blood vessels, such as a stent placement, a coronary artery bypass, or surgery on an artery in your head, neck, heart, or legs? No   3. Do you have chest pain with activity? No   4. Do you have a history of  heart failure? No   5. Do you currently have a cold, bronchitis or symptoms of other infection? No   6. Do you have a cough, shortness of breath, or wheezing? No   7. Do you or anyone in your family have previous history of blood clots? No   8. Do you or does anyone in your family have a serious bleeding problem such as prolonged bleeding following surgeries or cuts? No   9. Have you ever had problems with anemia or been told to take iron pills? No   10. Have you had any abnormal blood loss such as black, tarry or bloody stools? No   11. Have you ever had a blood transfusion? No   12. Are you willing to have a blood transfusion if it is medically needed before, during, or after your surgery? Yes   13. Have you or any of your relatives ever had problems with anesthesia? No   14. Do you have sleep apnea, excessive snoring or daytime drowsiness? No   15. Do you have any artifical heart valves or other implanted medical devices like a pacemaker, defibrillator, or continuous glucose monitor? No   16. Do you have artificial joints? No   17. Are you allergic to latex? No       Health Care Directive:  Patient does not have a Health Care Directive or Living Will: Discussed advance care planning with patient; however, patient declined at this time.    Preoperative Review of :   reviewed - no record of controlled substances prescribed.      Status of Chronic Conditions:  See problem list for active medical problems.  Problems all longstanding and stable, except as noted/documented.  See ROS for pertinent symptoms related to these conditions.      Review of Systems  Constitutional, neuro, ENT, endocrine, pulmonary, cardiac, gastrointestinal,  "genitourinary, musculoskeletal, integument and psychiatric systems are negative, except as otherwise noted.    Patient Active Problem List    Diagnosis Date Noted     Morbid obesity (H) 07/09/2021     Priority: Medium     Lipoma Of The Adipose Tissue      Priority: Medium     Created by Conversion  Replacement Utility updated for latest IMO load          History reviewed. No pertinent past medical history.  History reviewed. No pertinent surgical history.  No current outpatient medications on file.       No Known Allergies     Social History     Tobacco Use     Smoking status: Former Smoker     Smokeless tobacco: Never Used     Tobacco comment: Only for 1 year in college   Substance Use Topics     Alcohol use: Not on file     History reviewed. No pertinent family history.  History   Drug Use Not on file         Objective     BP (!) 134/92   Pulse 100   Temp 97.3  F (36.3  C)   Resp 16   Ht 1.905 m (6' 3\")   Wt (!) 173.6 kg (382 lb 12.8 oz)   SpO2 97%   BMI 47.85 kg/m      Physical Exam    GENERAL APPEARANCE: healthy, alert and no distress     EYES: EOMI,  PERRL     HENT: ear canals and TM's normal and nose and mouth without ulcers or lesions     NECK: no adenopathy, no asymmetry, masses, or scars and thyroid normal to palpation     RESP: lungs clear to auscultation - no rales, rhonchi or wheezes     CV: regular rates and rhythm, normal S1 S2, no S3 or S4 and no murmur, click or rub     ABDOMEN:  soft, nontender, no HSM or masses and bowel sounds normal     MS: extremities normal- no gross deformities noted, no evidence of inflammation in joints, FROM in all extremities.     SKIN: no suspicious lesions or rashes     NEURO: Normal strength and tone, sensory exam grossly normal, mentation intact and speech normal     PSYCH: mentation appears normal. and affect normal/bright     LYMPHATICS: No cervical adenopathy    Recent Labs   Lab Test 07/09/21  0921   HGB 16.6      POTASSIUM 4.5   CR 0.89    "     Diagnostics:  Labs pending at this time.  Results will be reviewed when available.   No EKG required, no history of coronary heart disease, significant arrhythmia, peripheral arterial disease or other structural heart disease.    Revised Cardiac Risk Index (RCRI):  The patient has the following serious cardiovascular risks for perioperative complications:   - No serious cardiac risks = 0 points     RCRI Interpretation: 0 points: Class I (very low risk - 0.4% complication rate)           Signed Electronically by: Geno Soria PA-C  Copy of this evaluation report is provided to requesting physician.

## 2022-07-21 NOTE — PATIENT INSTRUCTIONS
Preparing for Your Surgery  Getting started  A nurse will call you to review your health history and instructions. They will give you an arrival time based on your scheduled surgery time. Please be ready to share:    Your doctor's clinic name and phone number    Your medical, surgical and anesthesia history    A list of allergies and sensitivities    A list of medicines, including herbal treatments and over-the-counter drugs    Whether the patient has a legal guardian (ask how to send us the papers in advance)  Please tell us if you're pregnant--or if there's any chance you might be pregnant. Some surgeries may injure a fetus (unborn baby), so they require a pregnancy test. Surgeries that are safe for a fetus don't always need a test, and you can choose whether to have one.   If you have a child who's having surgery, please ask for a copy of Preparing for Your Child's Surgery.    Preparing for surgery    Within 30 days of surgery: Have a pre-op exam (sometimes called an H&P, or History and Physical). This can be done at a clinic or pre-operative center.  ? If you're having a , you may not need this exam. Talk to your care team.    At your pre-op exam, talk to your care team about all medicines you take. If you need to stop any medicines before surgery, ask when to start taking them again.  ? We do this for your safety. Many medicines can make you bleed too much during surgery. Some change how well surgery (anesthesia) drugs work.    Call your insurance company to let them know you're having surgery. (If you don't have insurance, call [phone].)    Call your clinic if there's any change in your health. This includes signs of a cold or flu (sore throat, runny nose, cough, rash, fever). It also includes a scrape or scratch near the surgery site.    If you have questions on the day of surgery, call your hospital or surgery center.  COVID testing  You may need to be tested for COVID-19 before having  surgery. If so, we will give you instructions.  Eating and drinking guidelines  For your safety: Unless your surgeon tells you otherwise, follow the guidelines below.    Eat and drink as usual until 8 hours before surgery. After that, no food or milk.    Drink clear liquids until 2 hours before surgery. These are liquids you can see through, like water, Gatorade and Propel Water. You may also have black coffee and tea (no cream or milk).    Nothing by mouth within 2 hours of surgery. This includes gum, candy and breath mints.    If you drink alcohol: Stop drinking it the night before surgery.    If your care team tells you to take medicine on the morning of surgery, it's okay to take it with a sip of water.  Preventing infection    Shower or bathe the night before and morning of your surgery. Follow the instructions your clinic gave you. (If no instructions, use regular soap.)    Don't shave or clip hair near your surgery site. We'll remove the hair if needed.    Don't smoke or vape the morning of surgery. You may chew nicotine gum up to 2 hours before surgery. A nicotine patch is okay.  ? Note: Some surgeries require you to completely quit smoking and nicotine. Check with your surgeon.    Your care team will make every effort to keep you safe from infection. We will:  ? Clean our hands often with soap and water (or an alcohol-based hand rub).  ? Clean the skin at your surgery site with a special soap that kills germs.  ? Give you a special gown to keep you warm. (Cold raises the risk of infection.)  ? Wear special hair covers, masks, gowns and gloves during surgery.  ? Give antibiotic medicine, if prescribed. Not all surgeries need antibiotics.  What to bring on the day of surgery    Photo ID and insurance card    Copy of your health care directive, if you have one    Glasses and hearing aides (bring cases)  ? You can't wear contacts during surgery    Inhaler and eye drops, if you use them (tell us about these when  you arrive)    CPAP machine or breathing device, if you use them    A few personal items, if spending the night    If you have . . .  ? A pacemaker, ICD (cardiac defibrillator) or other implant: Bring the ID card.  ? An implanted stimulator: Bring the remote control.  ? A legal guardian: Bring a copy of the certified (court-stamped) guardianship papers.  Please remove any jewelry, including body piercings. Leave jewelry and other valuables at home.  If you're going home the day of surgery    You must have a responsible adult drive you home. They should stay with you overnight as well.    If you don't have someone to stay with you, and you aren't safe to go home alone, we may keep you overnight. Insurance often won't pay for this.  After surgery  If it's hard to control your pain or you need more pain medicine, please call your surgeon's office.  Questions?   If you have any questions for your care team, list them here: _________________________________________________________________________________________________________________________________________________________________________ ____________________________________ ____________________________________ ____________________________________  For informational purposes only. Not to replace the advice of your health care provider. Copyright   2003, 2019 [facility]. All rights reserved. Clinically reviewed by Latesha Dumont MD. [employer] [id number] - REV 07/21.

## 2022-07-22 LAB
ALBUMIN SERPL-MCNC: 4.1 G/DL (ref 3.4–5)
ALP SERPL-CCNC: 109 U/L (ref 40–150)
ALT SERPL W P-5'-P-CCNC: 59 U/L (ref 0–70)
ANION GAP SERPL CALCULATED.3IONS-SCNC: 5 MMOL/L (ref 3–14)
AST SERPL W P-5'-P-CCNC: 32 U/L (ref 0–45)
BILIRUB SERPL-MCNC: 0.6 MG/DL (ref 0.2–1.3)
BUN SERPL-MCNC: 10 MG/DL (ref 7–30)
CALCIUM SERPL-MCNC: 9.2 MG/DL (ref 8.5–10.1)
CHLORIDE BLD-SCNC: 109 MMOL/L (ref 94–109)
CO2 SERPL-SCNC: 27 MMOL/L (ref 20–32)
CREAT SERPL-MCNC: 0.85 MG/DL (ref 0.66–1.25)
GFR SERPL CREATININE-BSD FRML MDRD: >90 ML/MIN/1.73M2
GLUCOSE BLD-MCNC: 154 MG/DL (ref 70–99)
POTASSIUM BLD-SCNC: 4 MMOL/L (ref 3.4–5.3)
PROT SERPL-MCNC: 7.5 G/DL (ref 6.8–8.8)
SODIUM SERPL-SCNC: 141 MMOL/L (ref 133–144)

## 2022-07-22 NOTE — RESULT ENCOUNTER NOTE
"Kermit Stevenson  Your attached labs are normal. Best of luck with your upcoming procedure!  Please contact the office with any questions or concerns.    Geno Askew \"Qamar\" SIL Soria    "

## 2022-07-26 ENCOUNTER — ANESTHESIA EVENT (OUTPATIENT)
Dept: SURGERY | Facility: CLINIC | Age: 37
End: 2022-07-26
Payer: COMMERCIAL

## 2022-07-26 ASSESSMENT — LIFESTYLE VARIABLES: TOBACCO_USE: 1

## 2022-07-27 ENCOUNTER — ANESTHESIA (OUTPATIENT)
Dept: SURGERY | Facility: CLINIC | Age: 37
End: 2022-07-27
Payer: COMMERCIAL

## 2022-07-27 ENCOUNTER — HOSPITAL ENCOUNTER (OUTPATIENT)
Facility: CLINIC | Age: 37
Discharge: HOME | End: 2022-07-27
Attending: UROLOGY | Admitting: UROLOGY
Payer: COMMERCIAL

## 2022-07-27 VITALS
HEART RATE: 63 BPM | SYSTOLIC BLOOD PRESSURE: 113 MMHG | TEMPERATURE: 96.7 F | DIASTOLIC BLOOD PRESSURE: 63 MMHG | WEIGHT: 315 LBS | RESPIRATION RATE: 20 BRPM | OXYGEN SATURATION: 97 % | HEIGHT: 74 IN | BODY MASS INDEX: 40.43 KG/M2

## 2022-07-27 DIAGNOSIS — N43.3 HYDROCELE IN ADULT: Primary | ICD-10-CM

## 2022-07-27 PROCEDURE — 370N000017 HC ANESTHESIA TECHNICAL FEE, PER MIN: Performed by: UROLOGY

## 2022-07-27 PROCEDURE — 272N000001 HC OR GENERAL SUPPLY STERILE: Performed by: UROLOGY

## 2022-07-27 PROCEDURE — 360N000075 HC SURGERY LEVEL 2, PER MIN: Performed by: UROLOGY

## 2022-07-27 PROCEDURE — 250N000025 HC SEVOFLURANE, PER MIN: Performed by: UROLOGY

## 2022-07-27 PROCEDURE — 250N000009 HC RX 250: Performed by: NURSE ANESTHETIST, CERTIFIED REGISTERED

## 2022-07-27 PROCEDURE — 88302 TISSUE EXAM BY PATHOLOGIST: CPT | Mod: 26 | Performed by: PATHOLOGY

## 2022-07-27 PROCEDURE — 710N000012 HC RECOVERY PHASE 2, PER MINUTE: Performed by: UROLOGY

## 2022-07-27 PROCEDURE — 250N000009 HC RX 250: Performed by: UROLOGY

## 2022-07-27 PROCEDURE — 88302 TISSUE EXAM BY PATHOLOGIST: CPT | Mod: TC | Performed by: UROLOGY

## 2022-07-27 PROCEDURE — 258N000003 HC RX IP 258 OP 636: Performed by: ANESTHESIOLOGY

## 2022-07-27 PROCEDURE — 999N000141 HC STATISTIC PRE-PROCEDURE NURSING ASSESSMENT: Performed by: UROLOGY

## 2022-07-27 PROCEDURE — 258N000003 HC RX IP 258 OP 636: Performed by: NURSE ANESTHETIST, CERTIFIED REGISTERED

## 2022-07-27 PROCEDURE — 250N000011 HC RX IP 250 OP 636: Performed by: PHYSICIAN ASSISTANT

## 2022-07-27 PROCEDURE — 250N000013 HC RX MED GY IP 250 OP 250 PS 637: Performed by: PHYSICIAN ASSISTANT

## 2022-07-27 PROCEDURE — 250N000011 HC RX IP 250 OP 636: Performed by: NURSE ANESTHETIST, CERTIFIED REGISTERED

## 2022-07-27 PROCEDURE — 710N000009 HC RECOVERY PHASE 1, LEVEL 1, PER MIN: Performed by: UROLOGY

## 2022-07-27 RX ORDER — CEFAZOLIN SODIUM/WATER 3 G/30 ML
3 SYRINGE (ML) INTRAVENOUS SEE ADMIN INSTRUCTIONS
Status: DISCONTINUED | OUTPATIENT
Start: 2022-07-27 | End: 2022-07-27 | Stop reason: HOSPADM

## 2022-07-27 RX ORDER — BUPIVACAINE HYDROCHLORIDE 5 MG/ML
INJECTION, SOLUTION PERINEURAL PRN
Status: DISCONTINUED | OUTPATIENT
Start: 2022-07-27 | End: 2022-07-27 | Stop reason: HOSPADM

## 2022-07-27 RX ORDER — OXYCODONE HYDROCHLORIDE 5 MG/1
5 TABLET ORAL EVERY 4 HOURS PRN
Status: DISCONTINUED | OUTPATIENT
Start: 2022-07-27 | End: 2022-07-27 | Stop reason: HOSPADM

## 2022-07-27 RX ORDER — DEXAMETHASONE SODIUM PHOSPHATE 4 MG/ML
INJECTION, SOLUTION INTRA-ARTICULAR; INTRALESIONAL; INTRAMUSCULAR; INTRAVENOUS; SOFT TISSUE PRN
Status: DISCONTINUED | OUTPATIENT
Start: 2022-07-27 | End: 2022-07-27

## 2022-07-27 RX ORDER — FENTANYL CITRATE 0.05 MG/ML
25 INJECTION, SOLUTION INTRAMUSCULAR; INTRAVENOUS EVERY 5 MIN PRN
Status: DISCONTINUED | OUTPATIENT
Start: 2022-07-27 | End: 2022-07-27 | Stop reason: HOSPADM

## 2022-07-27 RX ORDER — ACETAMINOPHEN 325 MG/1
975 TABLET ORAL ONCE
Status: COMPLETED | OUTPATIENT
Start: 2022-07-27 | End: 2022-07-27

## 2022-07-27 RX ORDER — LIDOCAINE 40 MG/G
CREAM TOPICAL
Status: DISCONTINUED | OUTPATIENT
Start: 2022-07-27 | End: 2022-07-27 | Stop reason: HOSPADM

## 2022-07-27 RX ORDER — BACITRACIN ZINC 500 [USP'U]/G
OINTMENT TOPICAL 3 TIMES DAILY
Qty: 30 G | Refills: 0 | Status: SHIPPED | OUTPATIENT
Start: 2022-07-27

## 2022-07-27 RX ORDER — GLYCOPYRROLATE 0.2 MG/ML
INJECTION, SOLUTION INTRAMUSCULAR; INTRAVENOUS PRN
Status: DISCONTINUED | OUTPATIENT
Start: 2022-07-27 | End: 2022-07-27

## 2022-07-27 RX ORDER — PROPOFOL 10 MG/ML
INJECTION, EMULSION INTRAVENOUS PRN
Status: DISCONTINUED | OUTPATIENT
Start: 2022-07-27 | End: 2022-07-27

## 2022-07-27 RX ORDER — NEOSTIGMINE METHYLSULFATE 1 MG/ML
VIAL (ML) INJECTION PRN
Status: DISCONTINUED | OUTPATIENT
Start: 2022-07-27 | End: 2022-07-27

## 2022-07-27 RX ORDER — FENTANYL CITRATE 0.05 MG/ML
25 INJECTION, SOLUTION INTRAMUSCULAR; INTRAVENOUS
Status: DISCONTINUED | OUTPATIENT
Start: 2022-07-27 | End: 2022-07-27 | Stop reason: HOSPADM

## 2022-07-27 RX ORDER — FENTANYL CITRATE 50 UG/ML
INJECTION, SOLUTION INTRAMUSCULAR; INTRAVENOUS PRN
Status: DISCONTINUED | OUTPATIENT
Start: 2022-07-27 | End: 2022-07-27

## 2022-07-27 RX ORDER — GINSENG 100 MG
CAPSULE ORAL PRN
Status: DISCONTINUED | OUTPATIENT
Start: 2022-07-27 | End: 2022-07-27 | Stop reason: HOSPADM

## 2022-07-27 RX ORDER — SODIUM CHLORIDE, SODIUM LACTATE, POTASSIUM CHLORIDE, CALCIUM CHLORIDE 600; 310; 30; 20 MG/100ML; MG/100ML; MG/100ML; MG/100ML
INJECTION, SOLUTION INTRAVENOUS CONTINUOUS
Status: DISCONTINUED | OUTPATIENT
Start: 2022-07-27 | End: 2022-07-27 | Stop reason: HOSPADM

## 2022-07-27 RX ORDER — EPHEDRINE SULFATE 50 MG/ML
INJECTION, SOLUTION INTRAMUSCULAR; INTRAVENOUS; SUBCUTANEOUS PRN
Status: DISCONTINUED | OUTPATIENT
Start: 2022-07-27 | End: 2022-07-27

## 2022-07-27 RX ORDER — ONDANSETRON 2 MG/ML
4 INJECTION INTRAMUSCULAR; INTRAVENOUS EVERY 30 MIN PRN
Status: DISCONTINUED | OUTPATIENT
Start: 2022-07-27 | End: 2022-07-27 | Stop reason: HOSPADM

## 2022-07-27 RX ORDER — ONDANSETRON 4 MG/1
4 TABLET, ORALLY DISINTEGRATING ORAL EVERY 30 MIN PRN
Status: DISCONTINUED | OUTPATIENT
Start: 2022-07-27 | End: 2022-07-27 | Stop reason: HOSPADM

## 2022-07-27 RX ORDER — HYDROMORPHONE HCL IN WATER/PF 6 MG/30 ML
0.2 PATIENT CONTROLLED ANALGESIA SYRINGE INTRAVENOUS EVERY 5 MIN PRN
Status: DISCONTINUED | OUTPATIENT
Start: 2022-07-27 | End: 2022-07-27 | Stop reason: HOSPADM

## 2022-07-27 RX ORDER — CEPHALEXIN 500 MG/1
500 CAPSULE ORAL 3 TIMES DAILY
Qty: 15 CAPSULE | Refills: 0 | Status: SHIPPED | OUTPATIENT
Start: 2022-07-27 | End: 2022-08-01

## 2022-07-27 RX ORDER — OXYCODONE HYDROCHLORIDE 5 MG/1
5-10 TABLET ORAL EVERY 6 HOURS PRN
Qty: 12 TABLET | Refills: 0 | Status: SHIPPED | OUTPATIENT
Start: 2022-07-27

## 2022-07-27 RX ORDER — MEPERIDINE HYDROCHLORIDE 25 MG/ML
12.5 INJECTION INTRAMUSCULAR; INTRAVENOUS; SUBCUTANEOUS
Status: DISCONTINUED | OUTPATIENT
Start: 2022-07-27 | End: 2022-07-27 | Stop reason: HOSPADM

## 2022-07-27 RX ORDER — LIDOCAINE HYDROCHLORIDE 20 MG/ML
INJECTION, SOLUTION INFILTRATION; PERINEURAL PRN
Status: DISCONTINUED | OUTPATIENT
Start: 2022-07-27 | End: 2022-07-27

## 2022-07-27 RX ORDER — OXYCODONE HYDROCHLORIDE 5 MG/1
5 TABLET ORAL ONCE
Status: DISCONTINUED | OUTPATIENT
Start: 2022-07-27 | End: 2022-07-27 | Stop reason: HOSPADM

## 2022-07-27 RX ORDER — ONDANSETRON 2 MG/ML
INJECTION INTRAMUSCULAR; INTRAVENOUS PRN
Status: DISCONTINUED | OUTPATIENT
Start: 2022-07-27 | End: 2022-07-27

## 2022-07-27 RX ORDER — CEFAZOLIN SODIUM/WATER 3 G/30 ML
3 SYRINGE (ML) INTRAVENOUS
Status: DISCONTINUED | OUTPATIENT
Start: 2022-07-27 | End: 2022-07-27 | Stop reason: HOSPADM

## 2022-07-27 RX ADMIN — PROPOFOL 70 MG: 10 INJECTION, EMULSION INTRAVENOUS at 07:44

## 2022-07-27 RX ADMIN — FENTANYL CITRATE 50 MCG: 50 INJECTION, SOLUTION INTRAMUSCULAR; INTRAVENOUS at 07:57

## 2022-07-27 RX ADMIN — PHENYLEPHRINE HYDROCHLORIDE 100 MCG: 10 INJECTION INTRAVENOUS at 08:26

## 2022-07-27 RX ADMIN — DEXAMETHASONE SODIUM PHOSPHATE 4 MG: 4 INJECTION, SOLUTION INTRA-ARTICULAR; INTRALESIONAL; INTRAMUSCULAR; INTRAVENOUS; SOFT TISSUE at 07:53

## 2022-07-27 RX ADMIN — PHENYLEPHRINE HYDROCHLORIDE 100 MCG: 10 INJECTION INTRAVENOUS at 08:16

## 2022-07-27 RX ADMIN — PHENYLEPHRINE HYDROCHLORIDE 100 MCG: 10 INJECTION INTRAVENOUS at 09:01

## 2022-07-27 RX ADMIN — MIDAZOLAM 2 MG: 1 INJECTION INTRAMUSCULAR; INTRAVENOUS at 07:37

## 2022-07-27 RX ADMIN — PHENYLEPHRINE HYDROCHLORIDE 100 MCG: 10 INJECTION INTRAVENOUS at 08:51

## 2022-07-27 RX ADMIN — Medication 3 G: at 07:35

## 2022-07-27 RX ADMIN — ONDANSETRON 4 MG: 2 INJECTION INTRAMUSCULAR; INTRAVENOUS at 08:54

## 2022-07-27 RX ADMIN — Medication 5 MG: at 08:10

## 2022-07-27 RX ADMIN — Medication 5 MG: at 09:01

## 2022-07-27 RX ADMIN — Medication 5 MG: at 09:13

## 2022-07-27 RX ADMIN — SODIUM CHLORIDE, POTASSIUM CHLORIDE, SODIUM LACTATE AND CALCIUM CHLORIDE: 600; 310; 30; 20 INJECTION, SOLUTION INTRAVENOUS at 08:49

## 2022-07-27 RX ADMIN — PHENYLEPHRINE HYDROCHLORIDE 100 MCG: 10 INJECTION INTRAVENOUS at 08:10

## 2022-07-27 RX ADMIN — ROCURONIUM BROMIDE 30 MG: 50 INJECTION, SOLUTION INTRAVENOUS at 07:49

## 2022-07-27 RX ADMIN — FENTANYL CITRATE 50 MCG: 50 INJECTION, SOLUTION INTRAMUSCULAR; INTRAVENOUS at 07:37

## 2022-07-27 RX ADMIN — PHENYLEPHRINE HYDROCHLORIDE 100 MCG: 10 INJECTION INTRAVENOUS at 08:04

## 2022-07-27 RX ADMIN — SODIUM CHLORIDE, POTASSIUM CHLORIDE, SODIUM LACTATE AND CALCIUM CHLORIDE: 600; 310; 30; 20 INJECTION, SOLUTION INTRAVENOUS at 06:36

## 2022-07-27 RX ADMIN — Medication 5 MG: at 08:20

## 2022-07-27 RX ADMIN — PHENYLEPHRINE HYDROCHLORIDE 200 MCG: 10 INJECTION INTRAVENOUS at 08:07

## 2022-07-27 RX ADMIN — PHENYLEPHRINE HYDROCHLORIDE 100 MCG: 10 INJECTION INTRAVENOUS at 08:19

## 2022-07-27 RX ADMIN — GLYCOPYRROLATE 0.8 MG: 0.2 INJECTION, SOLUTION INTRAMUSCULAR; INTRAVENOUS at 09:17

## 2022-07-27 RX ADMIN — SUCCINYLCHOLINE CHLORIDE 200 MG: 20 INJECTION, SOLUTION INTRAMUSCULAR; INTRAVENOUS; PARENTERAL at 07:44

## 2022-07-27 RX ADMIN — NEOSTIGMINE METHYLSULFATE 5 MG: 1 INJECTION, SOLUTION INTRAVENOUS at 09:17

## 2022-07-27 RX ADMIN — DEXMEDETOMIDINE HYDROCHLORIDE 0.5 MCG/KG/HR: 100 INJECTION, SOLUTION INTRAVENOUS at 07:48

## 2022-07-27 RX ADMIN — ACETAMINOPHEN 975 MG: 325 TABLET ORAL at 06:14

## 2022-07-27 RX ADMIN — LIDOCAINE HYDROCHLORIDE 60 MG: 20 INJECTION, SOLUTION INFILTRATION; PERINEURAL at 07:44

## 2022-07-27 ASSESSMENT — ENCOUNTER SYMPTOMS: ORTHOPNEA: 0

## 2022-07-27 NOTE — PROGRESS NOTES
Patient is adequate for discharge to home from Phase 2. Ox4 and AVSS. Tolerating PO, denies nausea. Pain well controlled. Discharge instructions completed with spouse Hazel. Sats remain 92% or above on RA. Pt understands plan to sleep upright, limit narcs as able and check in with PCP regarding need for sleep study.  Discharge medications and all belongings returned to patient and sent home.

## 2022-07-27 NOTE — BRIEF OP NOTE
[facility] Urology Brief Operative Note    Pre-operative diagnosis: Hydrocele, unspecified hydrocele type [N43.3], desired sterility   Post-operative diagnosis: Same   Procedure: Procedure(s):  RIGHT HYDROCELECTOMY  BILATERAL VASECTOMY   Surgeon: Xavier Mustafa MD, MD   Assistant(s): OR staff   Anesthesia: General endotracheal anesthesia   Estimated blood loss: Less than 10 ml   Total IV fluids: (See anesthesia record)   Blood transfusion: No transfusion was given during surgery   Total urine output: None   Drains: Penrose   Specimens: Right hydrocele sac, right and left vas portions   Implants: None   Findings: Right hydtocele repaired, bilateral vasectomy done   Complications: None   Condition: Stable   Comments: See dictated operative report for full details.    Xavier Mustafa MD, MD

## 2022-07-27 NOTE — ANESTHESIA CARE TRANSFER NOTE
Patient: Graham Quevedo    Procedure: Procedure(s):  RIGHT HYDROCELECTOMY  BILATERAL VASECTOMY       Diagnosis: Hydrocele, unspecified hydrocele type [N43.3]  Diagnosis Additional Information: No value filed.    Anesthesia Type:   General     Note:    Oropharynx: oropharynx clear of all foreign objects and spontaneously breathing  Level of Consciousness: drowsy  Oxygen Supplementation: face mask    Independent Airway: airway patency satisfactory and stable  Dentition: dentition unchanged  Vital Signs Stable: post-procedure vital signs reviewed and stable  Report to RN Given: handoff report given  Patient transferred to: PACU    Handoff Report: Identifed the Patient, Identified the Reponsible Provider, Reviewed the pertinent medical history, Discussed the surgical course, Reviewed Intra-OP anesthesia mangement and issues during anesthesia, Set expectations for post-procedure period and Allowed opportunity for questions and acknowledgement of understanding      Vitals:  Vitals Value Taken Time   BP     Temp     Pulse 76    Resp     SpO2 98        Electronically Signed By: SAMUEL Pantoja CRNA  July 27, 2022  9:43 AM

## 2022-07-27 NOTE — ANESTHESIA PREPROCEDURE EVALUATION
Anesthesia Pre-Procedure Evaluation    Patient: Graham Quevedo   MRN: [id number] : 1985        Procedure : Procedure(s):  RIGHT HYDROCELECTOMY  BILATERAL VASECTOMY          No past medical history on file.   No past surgical history on file.   No Known Allergies   Social History     Tobacco Use     Smoking status: Former Smoker     Smokeless tobacco: Never Used     Tobacco comment: Only for 1 year in college   Substance Use Topics     Alcohol use: Not on file      Wt Readings from Last 1 Encounters:   22 (!) 173.6 kg (382 lb 12.8 oz)        Anesthesia Evaluation   Pt has not had prior anesthetic     No history of anesthetic complications       ROS/MED HX  ENT/Pulmonary:     (+) STEPHEN risk factors, obese, tobacco use, Past use,  (-) asthma and sleep apnea   Neurologic:       Cardiovascular:    (-) TYLER, orthopnea/PND and syncope   METS/Exercise Tolerance: >4 METS    Hematologic:    (-) history of blood clots   Musculoskeletal: Comment: Right patellar tendonitis-knee pain       GI/Hepatic:    (-) GERD   Renal/Genitourinary:    (-) renal disease   Endo:     (+) Obesity,  (-) Type II DM   Psychiatric/Substance Use:       Infectious Disease:    (-) Recent Fever   Malignancy:       Other:            Physical Exam    Airway        Mallampati: II   TM distance: > 3 FB   Neck ROM: full   Mouth opening: > 3 cm    Respiratory Devices and Support         Dental  no notable dental history         Cardiovascular          Rhythm and rate: regular     Pulmonary           breath sounds clear to auscultation           OUTSIDE LABS:  CBC:   Lab Results   Component Value Date    HGB 15.9 2022    HGB 16.6 2021     BMP:   Lab Results   Component Value Date     2022     2021    POTASSIUM 4.0 2022    POTASSIUM 4.5 2021    CHLORIDE 109 2022    CHLORIDE 110 (H) 2021    CO2 27 2022    CO2 25 2021    BUN 10 2022    BUN 16 2021    CR 0.85 2022     CR 0.89 07/09/2021     (H) 07/21/2022     (H) 07/09/2021     COAGS: No results found for: PTT, INR, FIBR  POC: No results found for: BGM, HCG, HCGS  HEPATIC:   Lab Results   Component Value Date    ALBUMIN 4.1 07/21/2022    PROTTOTAL 7.5 07/21/2022    ALT 59 07/21/2022    AST 32 07/21/2022    ALKPHOS 109 07/21/2022    BILITOTAL 0.6 07/21/2022     OTHER:   Lab Results   Component Value Date    MAHESH 9.2 07/21/2022       Anesthesia Plan    ASA Status:  3      Anesthesia Type: General.     - Airway: ETT   Induction: RSI.      Techniques and Equipment:     - Airway: Video-Laryngoscope         Consents    Anesthesia Plan(s) and associated risks, benefits, and realistic alternatives discussed. Questions answered and patient/representative(s) expressed understanding.    - Discussed:     - Discussed with:  Patient      - Extended Intubation/Ventilatory Support Discussed: Yes.         Postoperative Care       PONV prophylaxis: Ondansetron (or other 5HT-3)     Comments:    Other Comments: Glidescope, intubation ramp, dexmed infusion            Prior to Admission medications    Not on File     Current Facility-Administered Medications Ordered in Epic   Medication Dose Route Frequency Last Rate Last Admin     ceFAZolin Sodium (ANCEF) injection 3 g  3 g Intravenous Pre-Op/Pre-procedure x 1 dose         ceFAZolin Sodium (ANCEF) injection 3 g  3 g Intravenous See Admin Instructions         lactated ringers infusion   Intravenous Continuous 25 mL/hr at 07/27/22 0636 New Bag at 07/27/22 0636     lidocaine (LMX4) cream   Topical Q1H PRN         lidocaine 1 % 0.1-1 mL  0.1-1 mL Other Q1H PRN         sodium chloride (PF) 0.9% PF flush 3 mL  3 mL Intracatheter Q8H         sodium chloride (PF) 0.9% PF flush 3 mL  3 mL Intracatheter q1 min prn         No current [facility]-ordered outpatient medications on file.       lactated ringers 25 mL/hr at 07/27/22 0636     No results for input(s): ABO, RH in the last 05352 hours.  No  results for input(s): HCG in the last 09655 hours.  No results found for this or any previous visit (from the past 744 hour(s)).      Benita Parker MD

## 2022-07-27 NOTE — DISCHARGE INSTRUCTIONS
Today you were given 975 mg of Tylenol at 6:15 am. The recommended daily maximum dose is 4000 mg.        Same Day Surgery Discharge Instructions for  Sedation and General Anesthesia     It's not unusual to feel dizzy, light-headed or faint for up to 24 hours after surgery or while taking pain medication.  If you have these symptoms: sit for a few minutes before standing and have someone assist you when you get up to walk or use the bathroom.    You should rest and relax for the next 24 hours. We recommend you make arrangements to have an adult stay with you for at least 24 hours after your discharge.  Avoid hazardous and strenuous activity.    DO NOT DRIVE any vehicle or operate mechanical equipment for 24 hours following the end of your surgery.  Even though you may feel normal, your reactions may be affected by the medication you have received.    Do not drink alcoholic beverages for 24 hours following surgery.     Slowly progress to your regular diet as you feel able. It's not unusual to feel nauseated and/or vomit after receiving anesthesia.  If you develop these symptoms, drink clear liquids (apple juice, ginger ale, broth, 7-up, etc. ) until you feel better.  If your nausea and vomiting persists for 24 hours, please notify your surgeon.      All narcotic pain medications, along with inactivity and anesthesia, can cause constipation. Drinking plenty of liquids and increasing fiber intake will help.    For any questions of a medical nature, call your surgeon.    Do not make important decisions for 24 hours.    If you had general anesthesia, you may have a sore throat for a couple of days related to the breathing tube used during surgery.  You may use Cepacol lozenges to help with this discomfort.  If it worsens or if you develop a fever, contact your surgeon.     If you feel your pain is not well managed with the pain medications prescribed by your surgeon, please contact your surgeon's office to let them know  so they can address your concerns.       [facility]  [address]  [place], [place]   [phone]      VASECTOMY POST-OPERATIVE INSTRUCTIONS    Spend the rest of the day off your feet.  When you get home, apply ice on scrotum for 20 minutes on, 20 minutes off.  Not directly on skin, for the rest of the day.    No bath or shower for 24 hours.    Take acetaminophen (Tylenol) as directed for pain.  No Aspirin or Ibuprofen products (Motrin, Advil, Nuprin, etc.) 7-10 days.    You may have drainage from the sutured area for 3 days.  Contact our office if drainage is bright red or lasts more than 3 days.    Watch for signs of infection, such as redness or red streaks, swelling, increasing pain, heat at the incision site, fever, or foul smelling discharge from the wound.  If signs of infection develop, contact our office immediately.    If you have skin sutures, they will dissolve and fall out in one to two weeks.  During this time, you may have some local swelling, black and blue marks around the incision, and discomfort.  It may be two to three months before the tenderness is completely gone.     You may resume some activity and light work the next day.  Avoid hard labor, long car rides out of town, sexual activity and all sports activities for at least one week.    Continue to use birth control until you get a negative sperm count result.    Collect your semen specimen directly into a small, clean container 12 weeks after the vasectomy and bring it to either our [place] office or our [place] office within 24 hours.  An appointment is not necessary, but PLEASE CALL AT LEAST ONE-DAY PRIOR TO BRINGING IN THE SPECIMEN.  SPECIMENS SUBMITTED IN RUBBER CONDOMS OR PLASTIC BAGS ARE NOT ACCEPTABLE.        Discharge Instructions for Hydrocele/Spermatocele     During your recovery:  To help reduce swelling, apply an ice pack or cold compress to the scrotum as directed. Do this for no longer than 15 minutes at a  time. Continue using the cold pack for 2 days or until swelling improves.  Take prescribed pain medications as directed.  Care for your incision as instructed.  Follow your surgeon s guidelines for showering. Avoid swimming, tub baths, using a hot tub, and other activities that cause the incision to be covered with water until your doctor says it s okay.  Wear a jockstrap or snug underwear as directed.  Avoid heavy lifting and strenuous exercise for 1 week or  as directed by surgeon.  Sexual intercourse may be resumed after your follow up appointment or as directed by surgeon  Do not drive until you are no longer taking pain medication and your doctor says it s okay.  You may have a drain to reduce swelling and infection.  The surgeon will remove it in the office.  Call Your Surgeon If You Have Any of the Following:  Chest pain or trouble breathing (call 196)  Fever of 100.4 F or higher  Symptoms of infection at the incision site such as increased redness or swelling, warmth, worsening pain, or foul-smelling drainage  Bleeding from the incision site  Pain gets worse or is not relieved by pain medications  Increased pain or swelling in the scrotum or groin area   Follow-Up  Make a follow up appointment with your surgeon as directed. You may also have sutures that need to be removed. Call your surgeon if you have any questions or concerns about your recovery.    **In the recovery room, your breathing became obstructed several times and your oxygen level dropped. When awaked, normal breathing resumed and oxygen levels mele. Talking to your primary care provider about a sleep study for obstructive sleep apnea would be a good idea! Also per the anesthesiologist, you should sleep upright tonight and minimize narcotics, start with 1/2 tab Oxycodone.***    **If you have questions or concerns about your procedure,   call Dr. Mustafa at [phone]**

## 2022-07-27 NOTE — OR NURSING
Patient brought a picture of home covid antigen test on phone as directed.  I personally saw this result and it was time stamped 7/26/2022 at 1800.  Result was negative.

## 2022-07-27 NOTE — OR NURSING
Sitting up in recliner for 20 minutes, Sp02 remains above 92% even with snoozing. Pt dressed, up in recliner and transported to Phase 2.

## 2022-07-27 NOTE — OP NOTE
Procedure Date: 07/27/2022    NAME OF PROCEDURES:  Right hydrocele repair and bilateral    PREOPERATIVE DIAGNOSES:     1.  Right hydrocele.  2.  Desired sterility.    POSTOPERATIVE DIAGNOSES:    1.  Right hydrocele.  2.  Desired sterility.    OPERATIVE NOTE:  Informed consent was obtained from the patient.  He was brought to the operating room, given endotracheal anesthesia in supine position.  Sterile prep and drape were applied and surgical timeout was taken.  A right transverse scrotal incision was made.  Dissection was carried down through the dartos layer to the hydrocele sac.  Hydrocele sac itself was fairly adherent to surrounding tissue, so it took a while to complete this dissection in order to allow the hydrocele to be delivered out through the wound.  Once the hydrocele was delivered out through the wound, the gubernaculum was divided using electrocautery along the avascular plane.  The upper end of the hydrocele was identified.  The hydrocele was opened on its anterior surface.  The fluid was drained out.  He had a couple of proteinaceous pearls which were removed.  The appendix epididymis was removed.  The redundant portion of the hydrocele sac was then trimmed using electrocautery and this piece was sent as a specimen.  Hemostasis was checked and found to be excellent.  The vas deferens was then localized in the right spermatic cord and dissected out approximately 1.5 cm segment.  Clamp was placed at each end.  The middle segment was excised.  The cut ends were cauterized, doubly ligated with 3-0 Prolene, and the end coming from the testicle was buried in the spermatic cord using the external spermatic fascia.  The left vas was then grasped and brought up towards the midline septum of the scrotum.  The vas was secured with a tiny vasectomy ring forceps and dissection of the fibrovascular tissue around the vas was done.  A 1.5 cm segment of the vas on the left side was then isolated with a clamp placed  at each end.  Middle segment excised and the cut ends were cauterized, doubly ligated with 3-0 Prolene, and the proximal end from the testicle was then buried in the spermatic sheath.  Hemostasis was checked on this side, found to be good.  A small amount of 0.5% Marcaine was injected and this was allowed to drop back into the left hemiscrotum.  The edges of the hydrocele sac were then approximated posterior to the cord and testicle using a running 3-0 Vicryl.  The upper ends of the hydrocele sac were secured to the external spermatic fascia using interrupted 3-0 Vicryls.  The right cord was then given a cord block with 10 mL of 0.5% Marcaine.  Hemostasis was checked again, found to be good.  Because of the size of the hydrocele and the extent of dissection needed to deliver the hydrocele, a small puncture was made in the dependent portion of the scrotum and a Penrose drain was placed through this up to the upper limit of dissection.  The Penrose was secured to the skin with a 2-0 nylon.  The testicle was then placed back into the wound in its anatomic position.  The dartos layer was closed with a running 2-0 Vicryl.  The skin was closed with interrupted 3-0 chromic mattress sutures.  Bacitracin was applied after injection of more 0.5% Marcaine into the wound.  Total of 20 mL of 0.5% Marcaine was used.  Bacitracin applied.  A fluff dressing and a jockstrap were placed.  He tolerated the procedure well and there were no complications.    ESTIMATED BLOOD LOSS:  10 mL or less.    SPECIMENS:  Right hydrocele sac and right and left vas deferens sent together for permanent.      He will be discharged out with the Penrose in place.  He will leave this in until a week from this upcoming Friday.  He will have oxycodone, Keflex and bacitracin prescriptions.  He understands he is not sterile right after this procedure and will need to check a semen sample starting in 3 months sequentially until they are negative.    Xavier HAHN  MD [facility]        D: 2022   T: 2022   MT: LUCA    Name:     NATALIE CAMARGO  MRN:      [id number]        Account:        [id number]   :      1985           Procedure Date: 2022     Document: [id number]    cc:  Xavier Mustafa MD

## 2022-07-27 NOTE — ANESTHESIA PROCEDURE NOTES
Airway       Patient location during procedure: OR       Procedure Start/Stop Times: 7/27/2022 7:46 AM  Staff -        Anesthesiologist:  Benita Parker MD       CRNA: Everett Magaña APRN CRNA       Other Anesthesia Staff: Remberto Tejada       Performed By: SRNA  Consent for Airway        Urgency: elective  Indications and Patient Condition       Indications for airway management: gigi-procedural       Induction type:intravenous       Mask difficulty assessment: 1 - vent by mask    Final Airway Details       Final airway type: endotracheal airway       Successful airway: ETT - single  Endotracheal Airway Details        ETT size (mm): 8.0       Cuffed: yes       Successful intubation technique: video laryngoscopy       VL Blade Size: MAC 4       Grade View of Cords: 1       Adjucts: stylet       Position: Right       Measured from: lips       Secured at (cm): 25    Post intubation assessment        Placement verified by: capnometry, equal breath sounds and chest rise        Number of attempts at approach: 1       Number of other approaches attempted: 0       Secured with: silk tape       Ease of procedure: easy       Dentition: Intact and Unchanged    Medication(s) Administered   Medication Administration Time: 7/27/2022 7:46 AM

## 2022-07-28 LAB
PATH REPORT.COMMENTS IMP SPEC: NORMAL
PATH REPORT.COMMENTS IMP SPEC: NORMAL
PATH REPORT.FINAL DX SPEC: NORMAL
PATH REPORT.GROSS SPEC: NORMAL
PATH REPORT.MICROSCOPIC SPEC OTHER STN: NORMAL
PATH REPORT.RELEVANT HX SPEC: NORMAL
PHOTO IMAGE: NORMAL

## 2022-09-11 ENCOUNTER — HEALTH MAINTENANCE LETTER (OUTPATIENT)
Age: 37
End: 2022-09-11

## 2023-10-07 ENCOUNTER — HEALTH MAINTENANCE LETTER (OUTPATIENT)
Age: 38
End: 2023-10-07

## 2024-11-30 ENCOUNTER — HEALTH MAINTENANCE LETTER (OUTPATIENT)
Age: 39
End: 2024-11-30

## 2025-06-14 ENCOUNTER — OFFICE VISIT (OUTPATIENT)
Dept: URGENT CARE | Facility: URGENT CARE | Age: 40
End: 2025-06-14
Payer: COMMERCIAL

## 2025-06-14 VITALS
TEMPERATURE: 97.8 F | OXYGEN SATURATION: 98 % | WEIGHT: 315 LBS | HEIGHT: 75 IN | DIASTOLIC BLOOD PRESSURE: 96 MMHG | SYSTOLIC BLOOD PRESSURE: 142 MMHG | HEART RATE: 78 BPM | RESPIRATION RATE: 18 BRPM | BODY MASS INDEX: 39.17 KG/M2

## 2025-06-14 DIAGNOSIS — S61.211A LACERATION OF LEFT INDEX FINGER WITHOUT FOREIGN BODY WITHOUT DAMAGE TO NAIL, INITIAL ENCOUNTER: Primary | ICD-10-CM

## 2025-06-14 PROCEDURE — 99213 OFFICE O/P EST LOW 20 MIN: CPT | Performed by: FAMILY MEDICINE

## 2025-06-14 PROCEDURE — 3080F DIAST BP >= 90 MM HG: CPT | Performed by: FAMILY MEDICINE

## 2025-06-14 PROCEDURE — 3077F SYST BP >= 140 MM HG: CPT | Performed by: FAMILY MEDICINE

## 2025-06-15 NOTE — PROGRESS NOTES
SUBJECTIVE:   39 year old male sustained laceration of left hand index 1 hours ago. Nature of injury: with wiping knife. Tetanus vaccination status reviewed: tetanus re-vaccination not indicated.     OBJECTIVE:   Patient appears well, vitals are normal. Laceration 1 cm noted involving distal pulp.  Description: clean wound edges, flap edge noted. Neurovascular and tendon structures are intact.    ASSESSMENT:   Laceration as described.    PLAN:   Wound cleansed, debrided of visible foreign material and necrotic tissue.  Surgicel placed with appropriate dressing.  Wound care instructions provided.  Observe for any signs of infection or other problems.  Return criteria explained in detail.      Sukhdev Urias MD  [facility]

## (undated) DEVICE — DRSG KERLIX FLUFFS X5

## (undated) DEVICE — SOL WATER IRRIG 1000ML BOTTLE 2F7114

## (undated) DEVICE — DRSG ADAPTIC 3X3" 6112

## (undated) DEVICE — SU VICRYL 2-0 SH 27" J317H

## (undated) DEVICE — BLADE CLIPPER 4406

## (undated) DEVICE — SU VICRYL 3-0 CT-2 27" J332H

## (undated) DEVICE — PACK MINOR SBA15MIFSE

## (undated) DEVICE — DRAIN PENROSE 0.25"X18" LATEX FREE GR201

## (undated) DEVICE — SU CHROMIC 3-0 SH 27" G122H

## (undated) DEVICE — SUCTION TIP YANKAUER W/O VENT K86

## (undated) DEVICE — ESU ELEC BLADE NN-STCK PLUMEPEN PRO 360D 10FT PLPRO4020

## (undated) DEVICE — LINEN TOWEL PACK X5 5464

## (undated) DEVICE — Device

## (undated) DEVICE — SU PROLENE 3-0 PS-2 18" 8687H

## (undated) DEVICE — PREP SKIN SCRUB TRAY 4461A

## (undated) DEVICE — SU VICRYL 4-0 PS-2 18" UND J496H

## (undated) DEVICE — SU ETHILON 2-0 FS 18" 664G

## (undated) DEVICE — GLOVE PROTEXIS MICRO 8.0  2D73PM80

## (undated) DEVICE — DRAPE MINOR PROCEDURE LAP 29496

## (undated) DEVICE — SOL NACL 0.9% IRRIG 1000ML BOTTLE 2F7124

## (undated) RX ORDER — LIDOCAINE HYDROCHLORIDE 20 MG/ML
INJECTION, SOLUTION EPIDURAL; INFILTRATION; INTRACAUDAL; PERINEURAL
Status: DISPENSED
Start: 2022-07-27

## (undated) RX ORDER — PROPOFOL 10 MG/ML
INJECTION, EMULSION INTRAVENOUS
Status: DISPENSED
Start: 2022-07-27

## (undated) RX ORDER — CEFAZOLIN SODIUM/WATER 3 G/30 ML
SYRINGE (ML) INTRAVENOUS
Status: DISPENSED
Start: 2022-07-27

## (undated) RX ORDER — BUPIVACAINE HYDROCHLORIDE 5 MG/ML
INJECTION, SOLUTION EPIDURAL; INTRACAUDAL
Status: DISPENSED
Start: 2022-07-27

## (undated) RX ORDER — DEXAMETHASONE SODIUM PHOSPHATE 4 MG/ML
INJECTION, SOLUTION INTRA-ARTICULAR; INTRALESIONAL; INTRAMUSCULAR; INTRAVENOUS; SOFT TISSUE
Status: DISPENSED
Start: 2022-07-27

## (undated) RX ORDER — FENTANYL CITRATE 50 UG/ML
INJECTION, SOLUTION INTRAMUSCULAR; INTRAVENOUS
Status: DISPENSED
Start: 2022-07-27

## (undated) RX ORDER — DEXMEDETOMIDINE HYDROCHLORIDE 4 UG/ML
INJECTION, SOLUTION INTRAVENOUS
Status: DISPENSED
Start: 2022-07-27

## (undated) RX ORDER — LIDOCAINE HYDROCHLORIDE 10 MG/ML
INJECTION, SOLUTION EPIDURAL; INFILTRATION; INTRACAUDAL; PERINEURAL
Status: DISPENSED
Start: 2022-07-27

## (undated) RX ORDER — ONDANSETRON 2 MG/ML
INJECTION INTRAMUSCULAR; INTRAVENOUS
Status: DISPENSED
Start: 2022-07-27

## (undated) RX ORDER — GINSENG 100 MG
CAPSULE ORAL
Status: DISPENSED
Start: 2022-07-27

## (undated) RX ORDER — ACETAMINOPHEN 325 MG/1
TABLET ORAL
Status: DISPENSED
Start: 2022-07-27